# Patient Record
Sex: MALE | Race: WHITE | NOT HISPANIC OR LATINO | Employment: OTHER | URBAN - METROPOLITAN AREA
[De-identification: names, ages, dates, MRNs, and addresses within clinical notes are randomized per-mention and may not be internally consistent; named-entity substitution may affect disease eponyms.]

---

## 2023-02-08 PROBLEM — I48.91 ATRIAL FIBRILLATION (MULTI): Status: ACTIVE | Noted: 2023-02-08

## 2023-02-08 PROBLEM — R20.2 HAND PARESTHESIA: Status: ACTIVE | Noted: 2023-02-08

## 2023-02-08 PROBLEM — N40.0 BPH (BENIGN PROSTATIC HYPERPLASIA): Status: ACTIVE | Noted: 2023-02-08

## 2023-02-08 PROBLEM — M25.569 JOINT PAIN, KNEE: Status: ACTIVE | Noted: 2023-02-08

## 2023-02-08 PROBLEM — S60.222A CONTUSION OF HAND, LEFT: Status: ACTIVE | Noted: 2023-02-08

## 2023-02-08 PROBLEM — R63.6 UNDERWEIGHT: Status: ACTIVE | Noted: 2023-02-08

## 2023-02-08 PROBLEM — R79.89 ELEVATED SERUM CREATININE: Status: ACTIVE | Noted: 2023-02-08

## 2023-02-08 PROBLEM — M54.50 LOWER BACK PAIN: Status: ACTIVE | Noted: 2023-02-08

## 2023-02-08 PROBLEM — L98.9 SKIN LESION: Status: ACTIVE | Noted: 2023-02-08

## 2023-02-08 PROBLEM — K31.7 GASTRIC POLYP: Status: ACTIVE | Noted: 2023-02-08

## 2023-02-08 PROBLEM — K62.5 RECTAL HEMORRHAGE: Status: ACTIVE | Noted: 2023-02-08

## 2023-02-08 PROBLEM — J06.9 ACUTE URI: Status: ACTIVE | Noted: 2023-02-08

## 2023-02-08 PROBLEM — I25.10 CORONARY ATHEROSCLEROSIS: Status: ACTIVE | Noted: 2023-02-08

## 2023-02-08 PROBLEM — N28.1 RENAL CYST: Status: ACTIVE | Noted: 2023-02-08

## 2023-02-08 PROBLEM — D64.9 ANEMIA: Status: ACTIVE | Noted: 2023-02-08

## 2023-02-08 PROBLEM — K63.89 SMALL BOWEL POLYP: Status: ACTIVE | Noted: 2023-02-08

## 2023-02-08 PROBLEM — H91.90 HEARING LOSS: Status: ACTIVE | Noted: 2023-02-08

## 2023-02-08 PROBLEM — F03.90 DEMENTIA WITHOUT BEHAVIORAL DISTURBANCE (MULTI): Status: ACTIVE | Noted: 2023-02-08

## 2023-02-08 PROBLEM — E87.1 HYPONATREMIA: Status: ACTIVE | Noted: 2023-02-08

## 2023-02-08 PROBLEM — M25.562 LEFT KNEE PAIN: Status: ACTIVE | Noted: 2023-02-08

## 2023-02-08 PROBLEM — R05.9 COUGH: Status: ACTIVE | Noted: 2023-02-08

## 2023-02-08 PROBLEM — N20.0 CALCULUS OF KIDNEY: Status: ACTIVE | Noted: 2023-02-08

## 2023-02-08 PROBLEM — K80.20 GALLSTONES: Status: ACTIVE | Noted: 2023-02-08

## 2023-02-08 PROBLEM — I10 BENIGN ESSENTIAL HYPERTENSION: Status: ACTIVE | Noted: 2023-02-08

## 2023-02-08 PROBLEM — R63.4 WEIGHT LOSS: Status: ACTIVE | Noted: 2023-02-08

## 2023-02-08 PROBLEM — H35.30 MACULAR DEGENERATION: Status: ACTIVE | Noted: 2023-02-08

## 2023-02-08 PROBLEM — K22.70 BARRETT ESOPHAGUS: Status: ACTIVE | Noted: 2023-02-08

## 2023-02-08 PROBLEM — R21 RASH: Status: ACTIVE | Noted: 2023-02-08
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
## 2023-02-08 PROBLEM — G62.9 PERIPHERAL NEUROPATHY: Status: ACTIVE | Noted: 2023-02-08

## 2023-02-08 PROBLEM — K80.20 ASYMPTOMATIC CHOLELITHIASIS: Status: ACTIVE | Noted: 2023-02-08

## 2023-02-08 PROBLEM — S01.511A LIP LACERATION: Status: ACTIVE | Noted: 2023-02-08

## 2023-02-08 PROBLEM — H61.20 EXCESSIVE CERUMEN IN EAR CANAL: Status: ACTIVE | Noted: 2023-02-08

## 2023-02-08 PROBLEM — R41.89 COGNITIVE IMPAIRMENT: Status: ACTIVE | Noted: 2023-02-08

## 2023-02-08 PROBLEM — I51.9 HEART DISEASE: Status: ACTIVE | Noted: 2023-02-08

## 2023-02-08 PROBLEM — N39.0 UTI (URINARY TRACT INFECTION): Status: ACTIVE | Noted: 2023-02-08

## 2023-02-08 PROBLEM — J06.9 ACUTE RESPIRATORY DISEASE: Status: ACTIVE | Noted: 2023-02-08

## 2023-02-08 PROBLEM — K59.00 CONSTIPATED: Status: ACTIVE | Noted: 2023-02-08

## 2023-02-08 PROBLEM — M25.519 SHOULDER PAIN: Status: ACTIVE | Noted: 2023-02-08

## 2023-02-08 PROBLEM — J20.9 ACUTE BRONCHITIS WITH BRONCHOSPASM: Status: ACTIVE | Noted: 2023-02-08

## 2023-02-08 PROBLEM — H61.23 BILATERAL IMPACTED CERUMEN: Status: ACTIVE | Noted: 2023-02-08

## 2023-02-08 PROBLEM — R58 BLOOD IN UNDERWEAR: Status: ACTIVE | Noted: 2023-02-08

## 2023-02-08 PROBLEM — R68.89 FORGETFULNESS: Status: ACTIVE | Noted: 2023-02-08

## 2023-02-08 PROBLEM — K63.5 BENIGN COLON POLYP: Status: ACTIVE | Noted: 2023-02-08

## 2023-02-08 PROBLEM — K40.90 INGUINAL HERNIA: Status: ACTIVE | Noted: 2023-02-08

## 2023-02-08 PROBLEM — E78.5 HYPERLIPIDEMIA: Status: ACTIVE | Noted: 2023-02-08

## 2023-02-08 PROBLEM — K21.9 GERD (GASTROESOPHAGEAL REFLUX DISEASE): Status: ACTIVE | Noted: 2023-02-08

## 2023-02-08 PROBLEM — D35.00 ADRENAL ADENOMA: Status: ACTIVE | Noted: 2023-02-08

## 2023-02-08 PROBLEM — R53.1 GENERALIZED WEAKNESS: Status: ACTIVE | Noted: 2023-02-08

## 2023-02-08 RX ORDER — TAMSULOSIN HYDROCHLORIDE 0.4 MG/1
0.4 CAPSULE ORAL
COMMUNITY
Start: 2015-09-28 | End: 2023-03-28 | Stop reason: SDUPTHER

## 2023-02-08 RX ORDER — LISINOPRIL 40 MG/1
TABLET ORAL
COMMUNITY
Start: 2021-05-28 | End: 2024-03-20 | Stop reason: SDUPTHER

## 2023-02-08 RX ORDER — FAMOTIDINE 20 MG/1
1 TABLET, FILM COATED ORAL
COMMUNITY
Start: 2020-04-21 | End: 2023-12-18 | Stop reason: SDUPTHER

## 2023-02-08 RX ORDER — UBIDECARENONE 75 MG
CAPSULE ORAL
Status: ON HOLD | COMMUNITY
End: 2024-06-01 | Stop reason: ENTERED-IN-ERROR

## 2023-02-24 ENCOUNTER — DOCUMENTATION (OUTPATIENT)
Dept: PRIMARY CARE | Facility: CLINIC | Age: 88
End: 2023-02-24
Payer: MEDICARE

## 2023-03-21 ENCOUNTER — OFFICE VISIT (OUTPATIENT)
Dept: PRIMARY CARE | Facility: CLINIC | Age: 88
End: 2023-03-21
Payer: MEDICARE

## 2023-03-21 VITALS
DIASTOLIC BLOOD PRESSURE: 62 MMHG | WEIGHT: 115.4 LBS | BODY MASS INDEX: 20.44 KG/M2 | TEMPERATURE: 97.6 F | SYSTOLIC BLOOD PRESSURE: 121 MMHG | OXYGEN SATURATION: 97 % | HEART RATE: 59 BPM

## 2023-03-21 DIAGNOSIS — E78.5 HYPERLIPIDEMIA, UNSPECIFIED HYPERLIPIDEMIA TYPE: ICD-10-CM

## 2023-03-21 DIAGNOSIS — I10 BENIGN ESSENTIAL HYPERTENSION: ICD-10-CM

## 2023-03-21 DIAGNOSIS — Z00.00 MEDICARE ANNUAL WELLNESS VISIT, SUBSEQUENT: ICD-10-CM

## 2023-03-21 DIAGNOSIS — Z00.00 ANNUAL PHYSICAL EXAM: ICD-10-CM

## 2023-03-21 DIAGNOSIS — I25.10 ATHEROSCLEROSIS OF NATIVE CORONARY ARTERY OF NATIVE HEART WITHOUT ANGINA PECTORIS: ICD-10-CM

## 2023-03-21 DIAGNOSIS — I48.0 PAROXYSMAL ATRIAL FIBRILLATION (MULTI): ICD-10-CM

## 2023-03-21 DIAGNOSIS — F03.A0 MILD DEMENTIA, UNSPECIFIED DEMENTIA TYPE, UNSPECIFIED WHETHER BEHAVIORAL, PSYCHOTIC, OR MOOD DISTURBANCE OR ANXIETY (MULTI): Primary | ICD-10-CM

## 2023-03-21 DIAGNOSIS — R63.4 WEIGHT LOSS: ICD-10-CM

## 2023-03-21 PROBLEM — J06.9 ACUTE URI: Status: RESOLVED | Noted: 2023-02-08 | Resolved: 2023-03-21

## 2023-03-21 PROBLEM — R58 BLOOD IN UNDERWEAR: Status: RESOLVED | Noted: 2023-02-08 | Resolved: 2023-03-21

## 2023-03-21 PROBLEM — R21 RASH: Status: RESOLVED | Noted: 2023-02-08 | Resolved: 2023-03-21

## 2023-03-21 PROBLEM — D64.9 ANEMIA: Status: RESOLVED | Noted: 2023-02-08 | Resolved: 2023-03-21

## 2023-03-21 PROBLEM — K62.5 RECTAL HEMORRHAGE: Status: RESOLVED | Noted: 2023-02-08 | Resolved: 2023-03-21

## 2023-03-21 PROBLEM — E87.1 HYPONATREMIA: Status: RESOLVED | Noted: 2023-02-08 | Resolved: 2023-03-21

## 2023-03-21 PROBLEM — M54.50 LOWER BACK PAIN: Status: RESOLVED | Noted: 2023-02-08 | Resolved: 2023-03-21

## 2023-03-21 PROBLEM — K59.00 CONSTIPATED: Status: RESOLVED | Noted: 2023-02-08 | Resolved: 2023-03-21

## 2023-03-21 PROCEDURE — 1160F RVW MEDS BY RX/DR IN RCRD: CPT | Performed by: INTERNAL MEDICINE

## 2023-03-21 PROCEDURE — G0442 ANNUAL ALCOHOL SCREEN 15 MIN: HCPCS | Performed by: INTERNAL MEDICINE

## 2023-03-21 PROCEDURE — 93000 ELECTROCARDIOGRAM COMPLETE: CPT | Performed by: INTERNAL MEDICINE

## 2023-03-21 PROCEDURE — 3074F SYST BP LT 130 MM HG: CPT | Performed by: INTERNAL MEDICINE

## 2023-03-21 PROCEDURE — G0439 PPPS, SUBSEQ VISIT: HCPCS | Performed by: INTERNAL MEDICINE

## 2023-03-21 PROCEDURE — 1159F MED LIST DOCD IN RCRD: CPT | Performed by: INTERNAL MEDICINE

## 2023-03-21 PROCEDURE — 99397 PER PM REEVAL EST PAT 65+ YR: CPT | Performed by: INTERNAL MEDICINE

## 2023-03-21 PROCEDURE — 99214 OFFICE O/P EST MOD 30 MIN: CPT | Performed by: INTERNAL MEDICINE

## 2023-03-21 PROCEDURE — G0444 DEPRESSION SCREEN ANNUAL: HCPCS | Performed by: INTERNAL MEDICINE

## 2023-03-21 PROCEDURE — 1170F FXNL STATUS ASSESSED: CPT | Performed by: INTERNAL MEDICINE

## 2023-03-21 PROCEDURE — 1036F TOBACCO NON-USER: CPT | Performed by: INTERNAL MEDICINE

## 2023-03-21 PROCEDURE — 3078F DIAST BP <80 MM HG: CPT | Performed by: INTERNAL MEDICINE

## 2023-03-21 ASSESSMENT — PATIENT HEALTH QUESTIONNAIRE - PHQ9
SUM OF ALL RESPONSES TO PHQ9 QUESTIONS 1 AND 2: 0
1. LITTLE INTEREST OR PLEASURE IN DOING THINGS: NOT AT ALL
2. FEELING DOWN, DEPRESSED OR HOPELESS: NOT AT ALL

## 2023-03-21 ASSESSMENT — ACTIVITIES OF DAILY LIVING (ADL)
BATHING: INDEPENDENT
GROCERY_SHOPPING: INDEPENDENT
DOING_HOUSEWORK: INDEPENDENT
DRESSING: INDEPENDENT
TAKING_MEDICATION: INDEPENDENT
MANAGING_FINANCES: INDEPENDENT

## 2023-03-21 ASSESSMENT — ENCOUNTER SYMPTOMS
ENDOCRINE NEGATIVE: 1
DIARRHEA: 0
GASTROINTESTINAL NEGATIVE: 1
ABDOMINAL PAIN: 0
NAUSEA: 0
HEADACHES: 0
DYSURIA: 0
EYES NEGATIVE: 1
COUGH: 0
FATIGUE: 0
DIFFICULTY URINATING: 0
DIZZINESS: 0
BLOOD IN STOOL: 0
HEMATOLOGIC/LYMPHATIC NEGATIVE: 1
RESPIRATORY NEGATIVE: 1
NEUROLOGICAL NEGATIVE: 1
CHEST TIGHTNESS: 0
SHORTNESS OF BREATH: 0
UNEXPECTED WEIGHT CHANGE: 1

## 2023-03-21 NOTE — PROGRESS NOTES
"Subjective   Patient ID: Sherri Grace is a 89 y.o. male who presents for Medicare Annual Wellness Visit Subsequent, Annual Exam, and Follow-up.    He is here accompanied by his wife and son in law  for MCR,CPE and to follow up on his medical problems ,in past  he saw neuro for his dementia,has been stable,has some 'behavioral issues,angry sometimes,he also has asymptomatic gallstones.has h/o A fib and CAD,s/p stent,,he exercise regularly and denies any chest pain except lately and with the covid pandemiac he has not been going to the gym.  He sees Dr Reagan for his A Fib,on Eliquis.  he denies any CP or SOB.  he sees Dr Shelby for eye exam,up to date.  he has a living will.  He denies any depressed moods,no alcohol abuse.  We had home care involved at last visit,they have meals on wheels,bt pt lost 5 more pounds,his wife and son in law not interested  in doing aggressive work up \"such rule out malignancy\",\"not interested in treatment even if he has it due to age and dementia.           Review of Systems   Constitutional:  Positive for unexpected weight change. Negative for fatigue.   HENT: Negative.  Negative for congestion.    Eyes: Negative.    Respiratory: Negative.  Negative for cough, chest tightness and shortness of breath.    Cardiovascular:  Negative for chest pain and leg swelling.   Gastrointestinal: Negative.  Negative for abdominal pain, blood in stool, diarrhea and nausea.   Endocrine: Negative.    Genitourinary: Negative.  Negative for difficulty urinating and dysuria.   Neurological: Negative.  Negative for dizziness and headaches.   Hematological: Negative.    Psychiatric/Behavioral:          Has dementia.       Objective   /62 (BP Location: Right arm, Patient Position: Sitting, BP Cuff Size: Adult)   Pulse 59   Temp 36.4 °C (97.6 °F) (Temporal)   Wt 52.3 kg (115 lb 6.4 oz)   SpO2 97%   BMI 20.44 kg/m²     Physical Exam  Vitals reviewed.   Constitutional:       Appearance: Normal " appearance.   HENT:      Head: Normocephalic and atraumatic.   Eyes:      Extraocular Movements: Extraocular movements intact.      Pupils: Pupils are equal, round, and reactive to light.   Cardiovascular:      Rate and Rhythm: Rhythm irregular.      Heart sounds: Normal heart sounds.   Pulmonary:      Effort: Pulmonary effort is normal. No respiratory distress.      Breath sounds: Normal breath sounds. No wheezing or rhonchi.   Abdominal:      General: Abdomen is flat. Bowel sounds are normal.      Palpations: Abdomen is soft.      Tenderness: There is no abdominal tenderness.   Musculoskeletal:         General: Normal range of motion.      Cervical back: Normal range of motion and neck supple.   Skin:     General: Skin is warm and dry.   Neurological:      General: No focal deficit present.      Mental Status: He is alert and oriented to person, place, and time.   Psychiatric:         Mood and Affect: Mood normal.         Behavior: Behavior normal.         Assessment/Plan

## 2023-03-21 NOTE — PROGRESS NOTES
Patient is here for an annual [facility] wellness visit, CPE, and follow up.

## 2023-03-22 NOTE — PATIENT INSTRUCTIONS
EKG done today.  Refer to psych for behavioral issues related to dementia.  I discussed case with wife and son in law.  Continue same meds.  I encouraged pt to go back to the GYM,he used to enjoy exercising there and socialize with othe rpeople.  Return in 6 months for follow up.  Keep appt with cardiologist.  Add Ensure and monitor weight.

## 2023-03-28 DIAGNOSIS — N40.0 BENIGN PROSTATIC HYPERPLASIA, UNSPECIFIED WHETHER LOWER URINARY TRACT SYMPTOMS PRESENT: ICD-10-CM

## 2023-03-28 RX ORDER — TAMSULOSIN HYDROCHLORIDE 0.4 MG/1
CAPSULE ORAL
Qty: 90 CAPSULE | Refills: 3 | Status: SHIPPED | OUTPATIENT
Start: 2023-03-28 | End: 2024-04-15 | Stop reason: SDUPTHER

## 2023-04-12 ENCOUNTER — PATIENT OUTREACH (OUTPATIENT)
Dept: PRIMARY CARE | Facility: CLINIC | Age: 88
End: 2023-04-12
Payer: MEDICARE

## 2023-05-22 ENCOUNTER — PATIENT OUTREACH (OUTPATIENT)
Dept: PRIMARY CARE | Facility: CLINIC | Age: 88
End: 2023-05-22
Payer: MEDICARE

## 2023-05-22 DIAGNOSIS — F03.A0 MILD DEMENTIA, UNSPECIFIED DEMENTIA TYPE, UNSPECIFIED WHETHER BEHAVIORAL, PSYCHOTIC, OR MOOD DISTURBANCE OR ANXIETY (MULTI): ICD-10-CM

## 2023-05-22 DIAGNOSIS — I10 BENIGN ESSENTIAL HYPERTENSION: ICD-10-CM

## 2023-05-22 PROCEDURE — 99490 CHRNC CARE MGMT STAFF 1ST 20: CPT | Performed by: INTERNAL MEDICINE

## 2023-05-22 NOTE — PROGRESS NOTES
Spoke with the patient's daughter who stated that the patient was having trouble with his fern and memory.

## 2023-07-18 ENCOUNTER — PATIENT OUTREACH (OUTPATIENT)
Dept: PRIMARY CARE | Facility: CLINIC | Age: 88
End: 2023-07-18
Payer: MEDICARE

## 2023-07-18 DIAGNOSIS — F03.A0 MILD DEMENTIA, UNSPECIFIED DEMENTIA TYPE, UNSPECIFIED WHETHER BEHAVIORAL, PSYCHOTIC, OR MOOD DISTURBANCE OR ANXIETY (MULTI): ICD-10-CM

## 2023-07-18 DIAGNOSIS — I10 BENIGN ESSENTIAL HYPERTENSION: ICD-10-CM

## 2023-07-18 PROCEDURE — 99490 CHRNC CARE MGMT STAFF 1ST 20: CPT | Performed by: INTERNAL MEDICINE

## 2023-08-01 ENCOUNTER — DOCUMENTATION (OUTPATIENT)
Dept: PRIMARY CARE | Facility: CLINIC | Age: 88
End: 2023-08-01
Payer: MEDICARE

## 2023-08-14 ENCOUNTER — PATIENT OUTREACH (OUTPATIENT)
Dept: PRIMARY CARE | Facility: CLINIC | Age: 88
End: 2023-08-14
Payer: MEDICARE

## 2023-08-14 DIAGNOSIS — F03.A11 MILD DEMENTIA WITH AGITATION, UNSPECIFIED DEMENTIA TYPE (MULTI): ICD-10-CM

## 2023-08-14 DIAGNOSIS — I10 HYPERTENSION, UNSPECIFIED TYPE: ICD-10-CM

## 2023-08-21 ENCOUNTER — TELEPHONE (OUTPATIENT)
Dept: PRIMARY CARE | Facility: CLINIC | Age: 88
End: 2023-08-21
Payer: MEDICARE

## 2023-08-21 NOTE — TELEPHONE ENCOUNTER
Pt needs a referral for a podiatrist sent to [facility] fax [phone], phone no. Is [phone]  Call daughter with questionsa

## 2023-09-05 ENCOUNTER — TELEPHONE (OUTPATIENT)
Dept: PHARMACY | Facility: HOSPITAL | Age: 88
End: 2023-09-05
Payer: MEDICARE

## 2023-09-05 NOTE — TELEPHONE ENCOUNTER
Population Health: Outreach by Ambulatory Pharmacy Team    Payor: [employer] [place]  Reason: Adherence  Medication: lisinopril 40mg  Outcome: Left Voicemail    Chau Nails, PharmD    PGY-1 Resident

## 2023-09-06 NOTE — TELEPHONE ENCOUNTER
I reviewed the progress note and agree with the resident’s findings and plans as written. Case discussed with resident.    Brent Rubalcava, PharmD

## 2023-09-14 ENCOUNTER — PATIENT OUTREACH (OUTPATIENT)
Dept: PRIMARY CARE | Facility: CLINIC | Age: 88
End: 2023-09-14
Payer: MEDICARE

## 2023-09-14 DIAGNOSIS — F03.A11 MILD DEMENTIA WITH AGITATION, UNSPECIFIED DEMENTIA TYPE (MULTI): ICD-10-CM

## 2023-09-14 DIAGNOSIS — I10 HYPERTENSION, UNSPECIFIED TYPE: ICD-10-CM

## 2023-09-20 ENCOUNTER — TELEMEDICINE (OUTPATIENT)
Dept: PRIMARY CARE | Facility: CLINIC | Age: 88
End: 2023-09-20
Payer: MEDICARE

## 2023-09-20 DIAGNOSIS — R41.89 COGNITIVE IMPAIRMENT: ICD-10-CM

## 2023-09-20 DIAGNOSIS — I10 BENIGN ESSENTIAL HYPERTENSION: ICD-10-CM

## 2023-09-20 DIAGNOSIS — E78.5 HYPERLIPIDEMIA, UNSPECIFIED HYPERLIPIDEMIA TYPE: ICD-10-CM

## 2023-09-20 DIAGNOSIS — I48.0 PAROXYSMAL ATRIAL FIBRILLATION (MULTI): ICD-10-CM

## 2023-09-20 DIAGNOSIS — F33.9 DEPRESSION, RECURRENT (CMS-HCC): Primary | ICD-10-CM

## 2023-09-20 PROBLEM — H61.22 IMPACTED CERUMEN OF LEFT EAR: Status: ACTIVE | Noted: 2023-09-20

## 2023-09-20 PROBLEM — H60.90 OTITIS EXTERNA: Status: ACTIVE | Noted: 2023-09-20

## 2023-09-20 PROCEDURE — 99213 OFFICE O/P EST LOW 20 MIN: CPT | Performed by: INTERNAL MEDICINE

## 2023-09-20 ASSESSMENT — ENCOUNTER SYMPTOMS
HEADACHES: 0
DIFFICULTY URINATING: 0
DYSURIA: 0
SHORTNESS OF BREATH: 0
BLOOD IN STOOL: 0
RESPIRATORY NEGATIVE: 1
CHEST TIGHTNESS: 0
ABDOMINAL PAIN: 0
DIZZINESS: 0
FATIGUE: 0
DIARRHEA: 0
EYES NEGATIVE: 1
UNEXPECTED WEIGHT CHANGE: 0
NEUROLOGICAL NEGATIVE: 1
CONFUSION: 1
GASTROINTESTINAL NEGATIVE: 1
ENDOCRINE NEGATIVE: 1
COUGH: 0
HEMATOLOGIC/LYMPHATIC NEGATIVE: 1
NAUSEA: 0

## 2023-09-20 ASSESSMENT — PATIENT HEALTH QUESTIONNAIRE - PHQ9
10. IF YOU CHECKED OFF ANY PROBLEMS, HOW DIFFICULT HAVE THESE PROBLEMS MADE IT FOR YOU TO DO YOUR WORK, TAKE CARE OF THINGS AT HOME, OR GET ALONG WITH OTHER PEOPLE: VERY DIFFICULT
8. MOVING OR SPEAKING SO SLOWLY THAT OTHER PEOPLE COULD HAVE NOTICED. OR THE OPPOSITE, BEING SO FIGETY OR RESTLESS THAT YOU HAVE BEEN MOVING AROUND A LOT MORE THAN USUAL: NOT AT ALL
1. LITTLE INTEREST OR PLEASURE IN DOING THINGS: NOT AT ALL
6. FEELING BAD ABOUT YOURSELF - OR THAT YOU ARE A FAILURE OR HAVE LET YOURSELF OR YOUR FAMILY DOWN: NEARLY EVERY DAY
9. THOUGHTS THAT YOU WOULD BE BETTER OFF DEAD, OR OF HURTING YOURSELF: SEVERAL DAYS
SUM OF ALL RESPONSES TO PHQ QUESTIONS 1-9: 8
2. FEELING DOWN, DEPRESSED OR HOPELESS: NEARLY EVERY DAY
7. TROUBLE CONCENTRATING ON THINGS, SUCH AS READING THE NEWSPAPER OR WATCHING TELEVISION: NOT AT ALL
4. FEELING TIRED OR HAVING LITTLE ENERGY: SEVERAL DAYS
SUM OF ALL RESPONSES TO PHQ9 QUESTIONS 1 AND 2: 3
5. POOR APPETITE OR OVEREATING: NOT AT ALL
3. TROUBLE FALLING OR STAYING ASLEEP OR SLEEPING TOO MUCH: NOT AT ALL

## 2023-09-20 NOTE — PROGRESS NOTES
"Subjective   Patient ID: Sherri Grace is a 89 y.o. male who presents for 6 month follow up .    Pt seen virtually with his daughter Everett by his side to follow up on his medical problems ,in past  he saw neuro Dr Garcia for his dementia,has been stable,has some 'behavioral issues,angry sometimes,he denies any complaints but his wife and daughter told me that he gets angry very easy.  he also has asymptomatic gallstones.has h/o A fib and CAD,s/p stent,  He sees Dr Reagan for his A Fib,on Eliquis.  he denies any CP or SOB.  he sees Dr Shelby for eye exam,up to date.  he has a living will.  He denies any depressed moods,no alcohol abuse.  We had home care involved at last visit,they have meals on wheels,bt pt lost some weight,his wife and son in law not interested  in doing aggressive work up \"such rule out malignancy\",\"not interested in treatment even if he has it due to age and dementia.           Review of Systems   Constitutional:  Negative for fatigue and unexpected weight change.   HENT: Negative.  Negative for congestion.    Eyes: Negative.    Respiratory: Negative.  Negative for cough, chest tightness and shortness of breath.    Cardiovascular:  Negative for chest pain and leg swelling.   Gastrointestinal: Negative.  Negative for abdominal pain, blood in stool, diarrhea and nausea.   Endocrine: Negative.    Genitourinary: Negative.  Negative for difficulty urinating and dysuria.   Neurological: Negative.  Negative for dizziness and headaches.   Hematological: Negative.    Psychiatric/Behavioral:  Positive for confusion.        Objective   There were no vitals taken for this visit.    Physical Exam  Constitutional:       General: He is not in acute distress.     Appearance: Normal appearance.   Pulmonary:      Effort: No respiratory distress.   Psychiatric:      Comments: Cheerful.         Assessment/Plan   Problem List Items Addressed This Visit       Atrial fibrillation (CMS/HCC)    Benign essential " hypertension     Stable on current med.  I recommend flu and covid booster vaccine.  Follow up in 3 months.         Cognitive impairment     Advised family to contact neuro to discuss his behavior and possible need for med.         Hyperlipidemia    Depression, recurrent (CMS/HCC) - Primary

## 2023-09-22 ENCOUNTER — APPOINTMENT (OUTPATIENT)
Dept: PRIMARY CARE | Facility: CLINIC | Age: 88
End: 2023-09-22
Payer: MEDICARE

## 2023-09-28 ENCOUNTER — TELEPHONE (OUTPATIENT)
Dept: PHARMACY | Facility: HOSPITAL | Age: 88
End: 2023-09-28
Payer: MEDICARE

## 2023-09-28 NOTE — TELEPHONE ENCOUNTER
Population Health: Outreach by Ambulatory Pharmacy Team    Payor: [employer] [place]  Reason: Adherence  Medication: Lisinopril 40 mg   Outcome: Patient Reached: Claims Adherence, Patient's POA says it should be delivered from [facility]    Venessa Spivey, Pharm D  PGY-1 Pharmacy Resident, [facility]

## 2023-10-12 ENCOUNTER — PATIENT OUTREACH (OUTPATIENT)
Dept: PRIMARY CARE | Facility: CLINIC | Age: 88
End: 2023-10-12
Payer: MEDICARE

## 2023-10-12 DIAGNOSIS — I10 HYPERTENSION, UNSPECIFIED TYPE: ICD-10-CM

## 2023-10-12 DIAGNOSIS — F32.A DEPRESSION, UNSPECIFIED DEPRESSION TYPE: ICD-10-CM

## 2023-10-12 PROCEDURE — 99490 CHRNC CARE MGMT STAFF 1ST 20: CPT | Performed by: INTERNAL MEDICINE

## 2023-10-12 NOTE — PROGRESS NOTES
Spoke with dtr. States that pt is physically doing fine but mentally and cognitively declining. Continues to have depression and is argumentative about any and everything and is also forgetful.   Pt remains active and ambulates with no device. Remains free of falls. Current med list up to date per dtr.     Dtr was wondering the name of memory Dr that was recommended to her. Will reach out to PCP

## 2023-10-18 ENCOUNTER — CLINICAL SUPPORT (OUTPATIENT)
Dept: PRIMARY CARE | Facility: CLINIC | Age: 88
End: 2023-10-18
Payer: MEDICARE

## 2023-10-18 PROCEDURE — G0008 ADMIN INFLUENZA VIRUS VAC: HCPCS | Performed by: INTERNAL MEDICINE

## 2023-10-18 PROCEDURE — 90662 IIV NO PRSV INCREASED AG IM: CPT | Performed by: INTERNAL MEDICINE

## 2023-10-18 NOTE — PROGRESS NOTES
Patient presents in the office for Flu vaccine.     This drug was administered by John Alanis MA at 11:20 AM per physician order.    Patient tolerated well.

## 2023-10-30 ENCOUNTER — TELEPHONE (OUTPATIENT)
Dept: PRIMARY CARE | Facility: CLINIC | Age: 88
End: 2023-10-30
Payer: MEDICARE

## 2023-10-30 NOTE — TELEPHONE ENCOUNTER
Patient's daughter, Everett would like a referral/order for an in home podiatrist for patient. She said that you gave her mother, Karina a referral and she would like the same for her father. Please advise.

## 2023-10-31 PROBLEM — S60.922A: Status: ACTIVE | Noted: 2018-07-03

## 2023-11-06 ENCOUNTER — PATIENT OUTREACH (OUTPATIENT)
Dept: PRIMARY CARE | Facility: CLINIC | Age: 88
End: 2023-11-06
Payer: MEDICARE

## 2023-11-06 DIAGNOSIS — F32.A DEPRESSION, UNSPECIFIED DEPRESSION TYPE: ICD-10-CM

## 2023-11-06 DIAGNOSIS — I10 HYPERTENSION, UNSPECIFIED TYPE: ICD-10-CM

## 2023-11-06 PROCEDURE — 99490 CHRNC CARE MGMT STAFF 1ST 20: CPT | Performed by: INTERNAL MEDICINE

## 2023-11-06 NOTE — PROGRESS NOTES
Spoke with dtr regarding pt. He is doing the same as far as behavior - mentally/cognitively impaired. No changes with medications noted. Ambulating well with no device. Dtr is aware to call this nurse with any questions or concerns.

## 2023-12-06 ENCOUNTER — OFFICE VISIT (OUTPATIENT)
Dept: PRIMARY CARE | Facility: CLINIC | Age: 88
End: 2023-12-06
Payer: MEDICARE

## 2023-12-06 VITALS
DIASTOLIC BLOOD PRESSURE: 68 MMHG | HEART RATE: 65 BPM | SYSTOLIC BLOOD PRESSURE: 122 MMHG | BODY MASS INDEX: 21.65 KG/M2 | OXYGEN SATURATION: 98 % | WEIGHT: 126.8 LBS | HEIGHT: 64 IN

## 2023-12-06 DIAGNOSIS — N40.0 BENIGN PROSTATIC HYPERPLASIA WITHOUT LOWER URINARY TRACT SYMPTOMS: ICD-10-CM

## 2023-12-06 DIAGNOSIS — F03.A3 MILD DEMENTIA WITH MOOD DISTURBANCE, UNSPECIFIED DEMENTIA TYPE (MULTI): Primary | ICD-10-CM

## 2023-12-06 DIAGNOSIS — I48.0 PAROXYSMAL ATRIAL FIBRILLATION (MULTI): ICD-10-CM

## 2023-12-06 DIAGNOSIS — I51.9 HEART DISEASE: ICD-10-CM

## 2023-12-06 DIAGNOSIS — K80.20 GALLSTONES: ICD-10-CM

## 2023-12-06 PROCEDURE — 1159F MED LIST DOCD IN RCRD: CPT | Performed by: INTERNAL MEDICINE

## 2023-12-06 PROCEDURE — G0009 ADMIN PNEUMOCOCCAL VACCINE: HCPCS | Performed by: INTERNAL MEDICINE

## 2023-12-06 PROCEDURE — 3074F SYST BP LT 130 MM HG: CPT | Performed by: INTERNAL MEDICINE

## 2023-12-06 PROCEDURE — 1036F TOBACCO NON-USER: CPT | Performed by: INTERNAL MEDICINE

## 2023-12-06 PROCEDURE — 1160F RVW MEDS BY RX/DR IN RCRD: CPT | Performed by: INTERNAL MEDICINE

## 2023-12-06 PROCEDURE — 90677 PCV20 VACCINE IM: CPT | Performed by: INTERNAL MEDICINE

## 2023-12-06 PROCEDURE — 99214 OFFICE O/P EST MOD 30 MIN: CPT | Performed by: INTERNAL MEDICINE

## 2023-12-06 PROCEDURE — 3078F DIAST BP <80 MM HG: CPT | Performed by: INTERNAL MEDICINE

## 2023-12-06 ASSESSMENT — PATIENT HEALTH QUESTIONNAIRE - PHQ9
SUM OF ALL RESPONSES TO PHQ9 QUESTIONS 1 AND 2: 0
2. FEELING DOWN, DEPRESSED OR HOPELESS: NOT AT ALL
1. LITTLE INTEREST OR PLEASURE IN DOING THINGS: NOT AT ALL

## 2023-12-06 NOTE — PROGRESS NOTES
"Subjective   Patient ID: Sherri Grace is a 89 y.o. male who presents for Follow-up.    This is a 89-year-old patient was brought in today by his son-in-law to follow up on his medical problems ,he has not seen the neuro Dr Garcia yet,whom he saw in past for his dementia,has some 'behavioral issues,angry sometimes, patient himself denies any complaints but his family told me that he gets angry very easy.  he also has asymptomatic gallstones.has h/o A fib and CAD,s/p stent,  He sees Dr Reagan for his A Fib,on [facility].  he denies any CP or SOB.  he sees Dr Shelby for eye exam,up to date.  he has a living will.  He denies any depressed moods,no alcohol abuse.  We had home care involved at last visit,they have meals on wheels,bt pt lost some weight,his wife and son in law not interested  in doing aggressive work up \"such rule out malignancy\",\"not interested in treatment even if he has it due to age and dementia.  But patient has gained few pounds since his last visit with me.           Review of Systems   Constitutional:  Negative for fatigue and unexpected weight change.   HENT: Negative.  Negative for congestion.    Eyes: Negative.    Respiratory: Negative.  Negative for cough, chest tightness and shortness of breath.    Cardiovascular:  Negative for chest pain and leg swelling.   Gastrointestinal: Negative.  Negative for abdominal pain, blood in stool, diarrhea and nausea.   Endocrine: Negative.    Genitourinary: Negative.  Negative for difficulty urinating and dysuria.   Neurological:  Negative for dizziness and headaches.        As HPI     Hematological: Negative.    Psychiatric/Behavioral:          As HPI.       Objective   /68 (BP Location: Left arm, Patient Position: Sitting)   Pulse 65   Ht 1.613 m (5' 3.5\")   Wt 57.5 kg (126 lb 12.8 oz)   SpO2 98%   BMI 22.11 kg/m²     Physical Exam  Vitals reviewed.   Constitutional:       Appearance: Normal appearance.   HENT:      Head: Normocephalic and " atraumatic.   Eyes:      Extraocular Movements: Extraocular movements intact.      Pupils: Pupils are equal, round, and reactive to light.   Cardiovascular:      Rate and Rhythm: Rhythm irregular.      Heart sounds: Normal heart sounds.   Pulmonary:      Effort: Pulmonary effort is normal. No respiratory distress.      Breath sounds: Normal breath sounds. No wheezing or rhonchi.   Abdominal:      General: Abdomen is flat. Bowel sounds are normal.      Palpations: Abdomen is soft.      Tenderness: There is no abdominal tenderness.   Musculoskeletal:         General: Normal range of motion.      Cervical back: Normal range of motion and neck supple.   Skin:     General: Skin is warm and dry.   Neurological:      General: No focal deficit present.      Mental Status: He is alert and oriented to person, place, and time.   Psychiatric:      Comments: Pleasantly demented cooperative with exam         Assessment/Plan   Problem List Items Addressed This Visit             ICD-10-CM    Atrial fibrillation (CMS/HCC) I48.91     Stable on current medication.  Prevnar 20 given to patient today.         BPH (benign prostatic hyperplasia) N40.0    Mild dementia (CMS/HCC) - Primary F03.A0     I referred patient back to his neurologist.         Heart disease I51.9     Patient denies any chest pain.  Stable continue same medication.         Gallstones K80.20     Asymptomatic.

## 2023-12-10 PROBLEM — S60.922A: Status: RESOLVED | Noted: 2018-07-03 | Resolved: 2023-12-10

## 2023-12-10 PROBLEM — R63.6 UNDERWEIGHT: Status: RESOLVED | Noted: 2023-02-08 | Resolved: 2023-12-10

## 2023-12-10 PROBLEM — R63.4 WEIGHT LOSS: Status: RESOLVED | Noted: 2023-02-08 | Resolved: 2023-12-10

## 2023-12-10 PROBLEM — R05.9 COUGH: Status: RESOLVED | Noted: 2023-02-08 | Resolved: 2023-12-10

## 2023-12-10 PROBLEM — R53.1 GENERALIZED WEAKNESS: Status: RESOLVED | Noted: 2023-02-08 | Resolved: 2023-12-10

## 2023-12-10 PROBLEM — N39.0 UTI (URINARY TRACT INFECTION): Status: RESOLVED | Noted: 2023-02-08 | Resolved: 2023-12-10

## 2023-12-10 PROBLEM — K22.70 BARRETT ESOPHAGUS: Status: RESOLVED | Noted: 2023-02-08 | Resolved: 2023-12-10

## 2023-12-10 ASSESSMENT — ENCOUNTER SYMPTOMS
HEADACHES: 0
UNEXPECTED WEIGHT CHANGE: 0
CHEST TIGHTNESS: 0
ENDOCRINE NEGATIVE: 1
GASTROINTESTINAL NEGATIVE: 1
ABDOMINAL PAIN: 0
BLOOD IN STOOL: 0
EYES NEGATIVE: 1
DYSURIA: 0
DIARRHEA: 0
FATIGUE: 0
SHORTNESS OF BREATH: 0
DIFFICULTY URINATING: 0
COUGH: 0
DIZZINESS: 0
NAUSEA: 0
RESPIRATORY NEGATIVE: 1
HEMATOLOGIC/LYMPHATIC NEGATIVE: 1

## 2023-12-11 ENCOUNTER — DOCUMENTATION (OUTPATIENT)
Dept: PRIMARY CARE | Facility: CLINIC | Age: 88
End: 2023-12-11
Payer: MEDICARE

## 2023-12-11 DIAGNOSIS — I10 HYPERTENSION, UNSPECIFIED TYPE: ICD-10-CM

## 2023-12-11 DIAGNOSIS — F03.A3 MILD DEMENTIA WITH MOOD DISTURBANCE, UNSPECIFIED DEMENTIA TYPE (MULTI): ICD-10-CM

## 2023-12-11 PROCEDURE — 99490 CHRNC CARE MGMT STAFF 1ST 20: CPT | Performed by: INTERNAL MEDICINE

## 2023-12-11 NOTE — PROGRESS NOTES
Spoke with dtr who is managing pt's care. He is physically able to do various things around the house or outside independently, but is forgetful at times. Can have increased confusion and can also be irritable. Redirection works sometimes,  other times pt becomes argumentative.  Medication list updated and no changes noted. Dtr knows to call this nurse with any questions or concerns.

## 2023-12-18 DIAGNOSIS — K21.9 GASTROESOPHAGEAL REFLUX DISEASE, UNSPECIFIED WHETHER ESOPHAGITIS PRESENT: Primary | ICD-10-CM

## 2023-12-18 RX ORDER — FAMOTIDINE 20 MG/1
20 TABLET, FILM COATED ORAL 2 TIMES DAILY
Qty: 180 TABLET | Refills: 3 | Status: SHIPPED | OUTPATIENT
Start: 2023-12-18

## 2024-01-11 ENCOUNTER — PATIENT OUTREACH (OUTPATIENT)
Dept: PRIMARY CARE | Facility: CLINIC | Age: 89
End: 2024-01-11
Payer: MEDICARE

## 2024-01-11 DIAGNOSIS — F03.A3 MILD DEMENTIA WITH MOOD DISTURBANCE, UNSPECIFIED DEMENTIA TYPE (MULTI): ICD-10-CM

## 2024-01-11 DIAGNOSIS — I48.0 PAROXYSMAL ATRIAL FIBRILLATION (MULTI): ICD-10-CM

## 2024-01-11 PROCEDURE — 99490 CHRNC CARE MGMT STAFF 1ST 20: CPT | Performed by: INTERNAL MEDICINE

## 2024-01-11 NOTE — PROGRESS NOTES
Spoke with dtr who pt lives with. Pt has had no significant changes. Dementia comes and goes. Pt is quiet at times, not engaging with anyone... Compliant with taking meds. Ambulates without difficulty. Wanders at times and needs redirection. No changes to meds and no refills needed at this time. Dtr aware to call this nurse with any questions or concerns.

## 2024-01-22 ENCOUNTER — TELEPHONE (OUTPATIENT)
Dept: PRIMARY CARE | Facility: CLINIC | Age: 89
End: 2024-01-22
Payer: MEDICARE

## 2024-01-22 DIAGNOSIS — R39.9 URINARY SYMPTOM OR SIGN: ICD-10-CM

## 2024-01-22 DIAGNOSIS — R55 SYNCOPE AND COLLAPSE: Primary | ICD-10-CM

## 2024-01-22 NOTE — TELEPHONE ENCOUNTER
Pts daughter called stating that pt passed out and was drooling and unresponsive. The ambulance was called but pt got better and did not go to ER. She is concerned of an infection and would like blood work. Pt will be coming in for his wifes appt on 1.24.24.

## 2024-01-24 ENCOUNTER — LAB (OUTPATIENT)
Dept: LAB | Facility: LAB | Age: 89
End: 2024-01-24
Payer: MEDICARE

## 2024-01-24 ENCOUNTER — OFFICE VISIT (OUTPATIENT)
Dept: PRIMARY CARE | Facility: CLINIC | Age: 89
End: 2024-01-24
Payer: MEDICARE

## 2024-01-24 VITALS
BODY MASS INDEX: 21.51 KG/M2 | DIASTOLIC BLOOD PRESSURE: 67 MMHG | TEMPERATURE: 97.9 F | OXYGEN SATURATION: 95 % | SYSTOLIC BLOOD PRESSURE: 167 MMHG | HEART RATE: 69 BPM | HEIGHT: 64 IN | WEIGHT: 126 LBS

## 2024-01-24 DIAGNOSIS — I10 BENIGN ESSENTIAL HYPERTENSION: ICD-10-CM

## 2024-01-24 DIAGNOSIS — R39.9 URINARY SYMPTOM OR SIGN: ICD-10-CM

## 2024-01-24 DIAGNOSIS — F33.9 DEPRESSION, RECURRENT (CMS-HCC): ICD-10-CM

## 2024-01-24 DIAGNOSIS — D64.9 ANEMIA, UNSPECIFIED TYPE: ICD-10-CM

## 2024-01-24 DIAGNOSIS — I48.0 PAROXYSMAL ATRIAL FIBRILLATION (MULTI): ICD-10-CM

## 2024-01-24 DIAGNOSIS — N18.31 STAGE 3A CHRONIC KIDNEY DISEASE (MULTI): ICD-10-CM

## 2024-01-24 DIAGNOSIS — R55 SYNCOPE AND COLLAPSE: Primary | ICD-10-CM

## 2024-01-24 DIAGNOSIS — R55 SYNCOPE AND COLLAPSE: ICD-10-CM

## 2024-01-24 DIAGNOSIS — F03.A3 MILD DEMENTIA WITH MOOD DISTURBANCE, UNSPECIFIED DEMENTIA TYPE (MULTI): ICD-10-CM

## 2024-01-24 LAB
ALBUMIN SERPL BCP-MCNC: 3.6 G/DL (ref 3.4–5)
ALP SERPL-CCNC: 80 U/L (ref 33–136)
ALT SERPL W P-5'-P-CCNC: 23 U/L (ref 10–52)
ANION GAP SERPL CALC-SCNC: 10 MMOL/L (ref 10–20)
AST SERPL W P-5'-P-CCNC: 29 U/L (ref 9–39)
BASOPHILS # BLD AUTO: 0.05 X10*3/UL (ref 0–0.1)
BASOPHILS NFR BLD AUTO: 0.7 %
BILIRUB SERPL-MCNC: 0.4 MG/DL (ref 0–1.2)
BUN SERPL-MCNC: 31 MG/DL (ref 6–23)
CALCIUM SERPL-MCNC: 8.8 MG/DL (ref 8.6–10.3)
CHLORIDE SERPL-SCNC: 101 MMOL/L (ref 98–107)
CO2 SERPL-SCNC: 30 MMOL/L (ref 21–32)
CREAT SERPL-MCNC: 1.24 MG/DL (ref 0.5–1.3)
EGFRCR SERPLBLD CKD-EPI 2021: 53 ML/MIN/1.73M*2
EOSINOPHIL # BLD AUTO: 0.44 X10*3/UL (ref 0–0.4)
EOSINOPHIL NFR BLD AUTO: 6.5 %
ERYTHROCYTE [DISTWIDTH] IN BLOOD BY AUTOMATED COUNT: 14.3 % (ref 11.5–14.5)
GLUCOSE SERPL-MCNC: 95 MG/DL (ref 74–99)
HCT VFR BLD AUTO: 32.5 % (ref 41–52)
HGB BLD-MCNC: 9.9 G/DL (ref 13.5–17.5)
IMM GRANULOCYTES # BLD AUTO: 0.02 X10*3/UL (ref 0–0.5)
IMM GRANULOCYTES NFR BLD AUTO: 0.3 % (ref 0–0.9)
LYMPHOCYTES # BLD AUTO: 1.27 X10*3/UL (ref 0.8–3)
LYMPHOCYTES NFR BLD AUTO: 18.8 %
MCH RBC QN AUTO: 29.3 PG (ref 26–34)
MCHC RBC AUTO-ENTMCNC: 30.5 G/DL (ref 32–36)
MCV RBC AUTO: 96 FL (ref 80–100)
MONOCYTES # BLD AUTO: 0.82 X10*3/UL (ref 0.05–0.8)
MONOCYTES NFR BLD AUTO: 12.1 %
NEUTROPHILS # BLD AUTO: 4.15 X10*3/UL (ref 1.6–5.5)
NEUTROPHILS NFR BLD AUTO: 61.6 %
NRBC BLD-RTO: 0 /100 WBCS (ref 0–0)
PLATELET # BLD AUTO: 277 X10*3/UL (ref 150–450)
POTASSIUM SERPL-SCNC: 4.2 MMOL/L (ref 3.5–5.3)
PROT SERPL-MCNC: 7.3 G/DL (ref 6.4–8.2)
RBC # BLD AUTO: 3.38 X10*6/UL (ref 4.5–5.9)
SODIUM SERPL-SCNC: 137 MMOL/L (ref 136–145)
WBC # BLD AUTO: 6.8 X10*3/UL (ref 4.4–11.3)

## 2024-01-24 PROCEDURE — 83540 ASSAY OF IRON: CPT

## 2024-01-24 PROCEDURE — 93000 ELECTROCARDIOGRAM COMPLETE: CPT | Performed by: INTERNAL MEDICINE

## 2024-01-24 PROCEDURE — 1159F MED LIST DOCD IN RCRD: CPT | Performed by: INTERNAL MEDICINE

## 2024-01-24 PROCEDURE — 99214 OFFICE O/P EST MOD 30 MIN: CPT | Performed by: INTERNAL MEDICINE

## 2024-01-24 PROCEDURE — 87186 SC STD MICRODIL/AGAR DIL: CPT

## 2024-01-24 PROCEDURE — 3077F SYST BP >= 140 MM HG: CPT | Performed by: INTERNAL MEDICINE

## 2024-01-24 PROCEDURE — 1036F TOBACCO NON-USER: CPT | Performed by: INTERNAL MEDICINE

## 2024-01-24 PROCEDURE — 80053 COMPREHEN METABOLIC PANEL: CPT

## 2024-01-24 PROCEDURE — 1160F RVW MEDS BY RX/DR IN RCRD: CPT | Performed by: INTERNAL MEDICINE

## 2024-01-24 PROCEDURE — 3078F DIAST BP <80 MM HG: CPT | Performed by: INTERNAL MEDICINE

## 2024-01-24 PROCEDURE — 36415 COLL VENOUS BLD VENIPUNCTURE: CPT

## 2024-01-24 PROCEDURE — 83550 IRON BINDING TEST: CPT

## 2024-01-24 PROCEDURE — 87086 URINE CULTURE/COLONY COUNT: CPT

## 2024-01-24 PROCEDURE — 82746 ASSAY OF FOLIC ACID SERUM: CPT

## 2024-01-24 PROCEDURE — 82607 VITAMIN B-12: CPT

## 2024-01-24 PROCEDURE — 85025 COMPLETE CBC W/AUTO DIFF WBC: CPT

## 2024-01-24 ASSESSMENT — ENCOUNTER SYMPTOMS
DIFFICULTY URINATING: 0
FATIGUE: 0
DYSURIA: 0
DIZZINESS: 0
ABDOMINAL PAIN: 0
DIARRHEA: 0
COUGH: 0
CHEST TIGHTNESS: 0
NAUSEA: 0
RESPIRATORY NEGATIVE: 1
PSYCHIATRIC NEGATIVE: 1
GASTROINTESTINAL NEGATIVE: 1
BLOOD IN STOOL: 0
HEADACHES: 0
UNEXPECTED WEIGHT CHANGE: 0
ENDOCRINE NEGATIVE: 1
SHORTNESS OF BREATH: 0
HEMATOLOGIC/LYMPHATIC NEGATIVE: 1
EYES NEGATIVE: 1

## 2024-01-24 ASSESSMENT — PATIENT HEALTH QUESTIONNAIRE - PHQ9
1. LITTLE INTEREST OR PLEASURE IN DOING THINGS: NOT AT ALL
SUM OF ALL RESPONSES TO PHQ9 QUESTIONS 1 AND 2: 0
2. FEELING DOWN, DEPRESSED OR HOPELESS: NOT AT ALL

## 2024-01-24 NOTE — PROGRESS NOTES
Subjective   Patient ID: Sherri Grace is a 89 y.o. male who presents for Passed out (Patient's daughter called stating patient was found passed out, drooling, and unresponsive. ).    This is a 89-year-old patient was brought in today by his son-in-law because on 1/22/2024 he found him unresponsive after he had his breakfast, was sitting on the chair and saw him drooling and unresponsive, no seizure activity seen, he called 911 by the time they came to the home he came back on his own and he denied any complaint, his family noted that he was tired that day and was sleepy, but no cough shortness of breath fever chills diarrhea or other complaint.  Family told me that EMS found the patient send vital signs to be stable, he declined going to the ER, family decided to bring him today at his wife's appointment, I did order labs to be done after this visit.  He is known to have dementia, but no neurological changes no slurred speech blurry vision numbness or weakness anywhere that were noticed by the family or by patient.  he also has asymptomatic gallstones.has h/o A fib and CAD,s/p stent,  He sees Dr Reagan for his A Fib,on [facility].  he denies any CP or SOB.  he sees Dr Shelby for eye exam,up to date.  he has a living will.  He denies any depressed moods,no alcohol abuse.         Review of Systems   Constitutional:  Negative for fatigue and unexpected weight change.   HENT: Negative.  Negative for congestion.    Eyes: Negative.    Respiratory: Negative.  Negative for cough, chest tightness and shortness of breath.    Cardiovascular:  Negative for chest pain and leg swelling.   Gastrointestinal: Negative.  Negative for abdominal pain, blood in stool, diarrhea and nausea.   Endocrine: Negative.    Genitourinary: Negative.  Negative for difficulty urinating and dysuria.   Neurological:  Negative for dizziness and headaches.        As HPI   Hematological: Negative.    Psychiatric/Behavioral: Negative.         Objective  "  /67 (BP Location: Right arm, Patient Position: Sitting, BP Cuff Size: Adult)   Pulse 69   Temp 36.6 °C (97.9 °F) (Temporal)   Ht 1.613 m (5' 3.5\")   Wt 57.2 kg (126 lb)   SpO2 95%   BMI 21.97 kg/m²     Physical Exam  Vitals reviewed.   Constitutional:       General: He is not in acute distress.     Appearance: Normal appearance.   HENT:      Head: Normocephalic and atraumatic.   Eyes:      Extraocular Movements: Extraocular movements intact.      Pupils: Pupils are equal, round, and reactive to light.   Neck:      Vascular: No carotid bruit.   Cardiovascular:      Heart sounds: Normal heart sounds.      Comments: Irregularly irregular S1-S2.  Pulmonary:      Effort: Pulmonary effort is normal. No respiratory distress.      Breath sounds: Normal breath sounds. No wheezing or rhonchi.   Abdominal:      General: Abdomen is flat. Bowel sounds are normal.      Palpations: Abdomen is soft.      Tenderness: There is no abdominal tenderness.   Musculoskeletal:         General: Normal range of motion.      Cervical back: Normal range of motion and neck supple.   Lymphadenopathy:      Cervical: No cervical adenopathy.   Skin:     General: Skin is warm and dry.   Neurological:      General: No focal deficit present.      Mental Status: He is alert and oriented to person, place, and time.      Cranial Nerves: No cranial nerve deficit.      Motor: No weakness.      Coordination: Coordination normal.      Deep Tendon Reflexes: Reflexes normal.   Psychiatric:      Comments: Pleasantly demented cooperative with exam         Assessment/Plan   Problem List Items Addressed This Visit             ICD-10-CM    Atrial fibrillation (CMS/HCC) I48.91     Stable EKG done today show A-fib with controlled ventricular response.           Benign essential hypertension I10     Stable continue same medication.         Mild dementia (CMS/HCC) F03.A0     Seen by neurologist in past.         Depression, recurrent (CMS/HCC) F33.9     " Related to his dementia stable.         Syncope and collapse - Primary R55     Syncope and collapse occurred on 1/22/2024, EMS were called, patient came back on his own evaluation of EMS was normal, patient at that time declined to go to the ER.  I need to rule out any CVA or cardiac event.  EKG done today show A-fib with controlled ventricular response at 71 bpm.  Will send him for CBC CMP urine culture.  I will also order stat CT of head to rule out CVA.  I advised his family, his wife and his son-in-law who are present at the exam room at the office, that if the collapse happen again they need to call 911 and be taken to the ER immediately.  He also should follow-up with his cardiologist EPS Dr. Reagan to rule out any further arrhythmia.         Relevant Orders    CT head wo IV contrast    ECG 12 lead (Clinic Performed) (Completed)    Stage 3a chronic kidney disease (CMS/HCC) N18.31     Keep well-hydrated.  Monitor GFR.  Avoid NSAID.

## 2024-01-24 NOTE — ASSESSMENT & PLAN NOTE
Syncope and collapse occurred on 1/22/2024, EMS were called, patient came back on his own evaluation of EMS was normal, patient at that time declined to go to the ER.  I need to rule out any CVA or cardiac event.  EKG done today show A-fib with controlled ventricular response at 71 bpm.  Will send him for CBC CMP urine culture.  I will also order stat CT of head to rule out CVA.  I advised his family, his wife and his son-in-law who are present at the exam room at the office, that if the collapse happen again they need to call 911 and be taken to the ER immediately.  He also should follow-up with his cardiologist EPS Dr. Reagan to rule out any further arrhythmia.

## 2024-01-25 DIAGNOSIS — D63.8 ANEMIA, CHRONIC DISEASE: Primary | ICD-10-CM

## 2024-01-25 DIAGNOSIS — D64.9 ANEMIA, UNSPECIFIED TYPE: Primary | ICD-10-CM

## 2024-01-25 DIAGNOSIS — N39.0 ACUTE UTI: Primary | ICD-10-CM

## 2024-01-25 PROBLEM — N18.31 STAGE 3A CHRONIC KIDNEY DISEASE (MULTI): Status: ACTIVE | Noted: 2024-01-25

## 2024-01-25 LAB
FOLATE SERPL-MCNC: >22.3 NG/ML
IRON SATN MFR SERPL: 13 % (ref 25–45)
IRON SERPL-MCNC: 43 UG/DL (ref 35–150)
TIBC SERPL-MCNC: 339 UG/DL (ref 240–445)
UIBC SERPL-MCNC: 296 UG/DL (ref 110–370)
VIT B12 SERPL-MCNC: 373 PG/ML (ref 211–911)

## 2024-01-25 RX ORDER — AMOXICILLIN 250 MG/1
250 CAPSULE ORAL 2 TIMES DAILY
Qty: 14 CAPSULE | Refills: 0 | Status: SHIPPED | OUTPATIENT
Start: 2024-01-25 | End: 2024-02-01

## 2024-01-25 NOTE — RESULT ENCOUNTER NOTE
Please let patient's wife or patient since his daughter that patient has UTI, I sent the prescription to his pharmacy for antibiotic.  Blood test show anemia, I am requesting the lab to do anemia study on his blood specimen from yesterday, his kidney is little bit abnormal, advised patient to increase his oral water intake

## 2024-01-25 NOTE — RESULT ENCOUNTER NOTE
Please let patient wife over daughter know because patient has dementia as if his hemoglobin is only 9 , he is iron B12 and folate level were normal therefore I am referring him to hematologist for further evaluation.

## 2024-01-26 LAB — BACTERIA UR CULT: ABNORMAL

## 2024-01-29 ENCOUNTER — TELEPHONE (OUTPATIENT)
Dept: PRIMARY CARE | Facility: CLINIC | Age: 89
End: 2024-01-29
Payer: MEDICARE

## 2024-02-08 ENCOUNTER — APPOINTMENT (OUTPATIENT)
Dept: RADIOLOGY | Facility: CLINIC | Age: 89
End: 2024-02-08
Payer: MEDICARE

## 2024-02-09 ENCOUNTER — HOSPITAL ENCOUNTER (OUTPATIENT)
Dept: RADIOLOGY | Facility: CLINIC | Age: 89
Discharge: HOME | End: 2024-02-09
Payer: MEDICARE

## 2024-02-09 DIAGNOSIS — R55 SYNCOPE AND COLLAPSE: ICD-10-CM

## 2024-02-09 PROCEDURE — 70450 CT HEAD/BRAIN W/O DYE: CPT

## 2024-02-09 PROCEDURE — 70450 CT HEAD/BRAIN W/O DYE: CPT | Performed by: STUDENT IN AN ORGANIZED HEALTH CARE EDUCATION/TRAINING PROGRAM

## 2024-02-15 ENCOUNTER — PATIENT OUTREACH (OUTPATIENT)
Dept: PRIMARY CARE | Facility: CLINIC | Age: 89
End: 2024-02-15
Payer: MEDICARE

## 2024-02-15 DIAGNOSIS — F03.A3 MILD DEMENTIA WITH MOOD DISTURBANCE, UNSPECIFIED DEMENTIA TYPE (MULTI): ICD-10-CM

## 2024-02-15 DIAGNOSIS — I10 HYPERTENSION, UNSPECIFIED TYPE: ICD-10-CM

## 2024-02-15 PROCEDURE — 99490 CHRNC CARE MGMT STAFF 1ST 20: CPT | Performed by: INTERNAL MEDICINE

## 2024-02-15 NOTE — PROGRESS NOTES
Spoke with dtr who is active in pt's care. States he's been doing well. Had an episode last month that he fainted and was unresponsive. Ran several tests and nothing found per dtr. Has had no SOB or dizziness. Does have a congested cough. Not taking anything at this time.  He continues to need supervision for safety and has had no falls. No changes to meds and no refills needed at this time.

## 2024-03-14 ENCOUNTER — PATIENT OUTREACH (OUTPATIENT)
Dept: PRIMARY CARE | Facility: CLINIC | Age: 89
End: 2024-03-14
Payer: MEDICARE

## 2024-03-14 DIAGNOSIS — F03.A3 MILD DEMENTIA WITH MOOD DISTURBANCE, UNSPECIFIED DEMENTIA TYPE (MULTI): ICD-10-CM

## 2024-03-14 DIAGNOSIS — I10 HYPERTENSION, UNSPECIFIED TYPE: ICD-10-CM

## 2024-03-14 PROCEDURE — 99490 CHRNC CARE MGMT STAFF 1ST 20: CPT | Performed by: INTERNAL MEDICINE

## 2024-03-14 NOTE — PROGRESS NOTES
Spoke with dtr. Pt seems status quo. Cognition is still not good and dtr would like to get him further evaluated with Neuro, but has been so busy with her mom, its hard for her to follow up for pt. Continues to get around well and has had no falls. No changes to meds. Dtr aware to call this nurse with any concerns.

## 2024-03-20 DIAGNOSIS — I10 BENIGN ESSENTIAL HYPERTENSION: Primary | ICD-10-CM

## 2024-03-20 RX ORDER — LISINOPRIL 40 MG/1
TABLET ORAL
Qty: 90 TABLET | Refills: 3 | Status: SHIPPED | OUTPATIENT
Start: 2024-03-20

## 2024-03-25 ENCOUNTER — OFFICE VISIT (OUTPATIENT)
Dept: PRIMARY CARE | Facility: CLINIC | Age: 89
End: 2024-03-25
Payer: MEDICARE

## 2024-03-25 VITALS
SYSTOLIC BLOOD PRESSURE: 128 MMHG | WEIGHT: 128.6 LBS | HEART RATE: 77 BPM | BODY MASS INDEX: 22.42 KG/M2 | DIASTOLIC BLOOD PRESSURE: 70 MMHG | OXYGEN SATURATION: 98 %

## 2024-03-25 DIAGNOSIS — D50.8 IRON DEFICIENCY ANEMIA SECONDARY TO INADEQUATE DIETARY IRON INTAKE: ICD-10-CM

## 2024-03-25 DIAGNOSIS — R05.9 COUGH, UNSPECIFIED TYPE: ICD-10-CM

## 2024-03-25 DIAGNOSIS — F03.A3 MILD DEMENTIA WITH MOOD DISTURBANCE, UNSPECIFIED DEMENTIA TYPE (MULTI): ICD-10-CM

## 2024-03-25 DIAGNOSIS — N18.31 STAGE 3A CHRONIC KIDNEY DISEASE (MULTI): ICD-10-CM

## 2024-03-25 DIAGNOSIS — I48.0 PAROXYSMAL ATRIAL FIBRILLATION (MULTI): ICD-10-CM

## 2024-03-25 DIAGNOSIS — R05.2 SUBACUTE COUGH: ICD-10-CM

## 2024-03-25 DIAGNOSIS — I10 BENIGN ESSENTIAL HYPERTENSION: ICD-10-CM

## 2024-03-25 DIAGNOSIS — J34.89 RHINORRHEA: Primary | ICD-10-CM

## 2024-03-25 DIAGNOSIS — T14.8XXA BRUISE: ICD-10-CM

## 2024-03-25 PROBLEM — Z86.79 HISTORY OF CARDIAC ARRHYTHMIA: Status: ACTIVE | Noted: 2024-03-25

## 2024-03-25 PROBLEM — Z86.69 HISTORY OF DISORDER OF EYE REGION: Status: ACTIVE | Noted: 2024-03-25

## 2024-03-25 PROBLEM — Z86.79 HISTORY OF HYPERTENSION: Status: ACTIVE | Noted: 2024-03-25

## 2024-03-25 PROCEDURE — 99214 OFFICE O/P EST MOD 30 MIN: CPT | Performed by: INTERNAL MEDICINE

## 2024-03-25 PROCEDURE — 1160F RVW MEDS BY RX/DR IN RCRD: CPT | Performed by: INTERNAL MEDICINE

## 2024-03-25 PROCEDURE — 3074F SYST BP LT 130 MM HG: CPT | Performed by: INTERNAL MEDICINE

## 2024-03-25 PROCEDURE — 1159F MED LIST DOCD IN RCRD: CPT | Performed by: INTERNAL MEDICINE

## 2024-03-25 PROCEDURE — 1036F TOBACCO NON-USER: CPT | Performed by: INTERNAL MEDICINE

## 2024-03-25 PROCEDURE — 3078F DIAST BP <80 MM HG: CPT | Performed by: INTERNAL MEDICINE

## 2024-03-25 RX ORDER — IPRATROPIUM BROMIDE 42 UG/1
SPRAY, METERED NASAL
Qty: 15 ML | Refills: 1 | Status: SHIPPED | OUTPATIENT
Start: 2024-03-25

## 2024-03-25 ASSESSMENT — ENCOUNTER SYMPTOMS
GASTROINTESTINAL NEGATIVE: 1
CHEST TIGHTNESS: 0
ABDOMINAL PAIN: 0
HEADACHES: 0
SHORTNESS OF BREATH: 0
UNEXPECTED WEIGHT CHANGE: 0
NEUROLOGICAL NEGATIVE: 1
FATIGUE: 0
PSYCHIATRIC NEGATIVE: 1
DYSURIA: 0
NAUSEA: 0
BLOOD IN STOOL: 0
RESPIRATORY NEGATIVE: 1
EYES NEGATIVE: 1
ENDOCRINE NEGATIVE: 1
HEMATOLOGIC/LYMPHATIC NEGATIVE: 1
DIZZINESS: 0
DIARRHEA: 0
DIFFICULTY URINATING: 0

## 2024-03-25 NOTE — PROGRESS NOTES
Subjective   Patient ID: Sherri Grace is a 89 y.o. male who presents for Follow-up and Coughing during eating .    This is a 89-year-old patient was brought in today by his son-in-law for general check up,denies any new problems,they put his meds in a pill box and has been taking his medication regularly he denies any complaint or any change in his general medical condition, no recent fall.  We discussed his labs and her CT head he has anemia, workup is negative except for low iron saturation, he has an appointment to see hematologist soon, family started giving him some protein supplement.  They note that he has occasional cough while eating food but according to his daughter she said she noticed runny nose and then cough, no dysphagia to liquids,And that has been going on infrequently.  Patient himself he denies any complaint no recent fall according to him.   He did have an episode for which he saw me shortly after on 1/22/2024 they found him unresponsive after he had his breakfast, was sitting on the chair and saw him drooling and unresponsive, no seizure activity seen, he called 911 by the time they came to the home he came back on his own and he denied any complaint, his family noted that he was tired that day and was sleepy, but no cough shortness of breath fever chills diarrhea or other complaint.  Family told me that EMS found the patient send vital signs to be stable, he declined going to the ER, family decided to bring him today at his wife's appointment, I did order labs to be done after this visit.  He is known to have dementia, but no neurological changes no slurred speech blurry vision numbness or weakness anywhere that were noticed by the family or by patient.  he also has asymptomatic gallstones.has h/o A fib and CAD,s/p stent,  He sees Dr Reagan for his A Fib,on [facility].  he denies any CP or SOB.  he sees Dr Shelby for eye exam,up to date.  he has a living will.  He denies any depressed moods,no  alcohol abuse.         Review of Systems   Constitutional:  Negative for fatigue and unexpected weight change.   HENT: Negative.  Negative for congestion.         As HPI runny nose when eating.   Eyes: Negative.    Respiratory: Negative.  Negative for chest tightness and shortness of breath.         As HPI.   Cardiovascular:  Negative for chest pain and leg swelling.   Gastrointestinal: Negative.  Negative for abdominal pain, blood in stool, diarrhea and nausea.   Endocrine: Negative.    Genitourinary: Negative.  Negative for difficulty urinating and dysuria.   Neurological: Negative.  Negative for dizziness and headaches.   Hematological: Negative.    Psychiatric/Behavioral: Negative.         Objective   /70 (BP Location: Left arm, Patient Position: Sitting)   Pulse 77   Wt 58.3 kg (128 lb 9.6 oz)   SpO2 98%   BMI 22.42 kg/m²     Physical Exam  Vitals reviewed.   Constitutional:       General: He is not in acute distress.     Appearance: Normal appearance.   HENT:      Head:      Comments: Noted fading ecchymosis over the left front frontal area, patient denies any recent foreign, his family who are present at the office including his wife and his son-in-law and his daughter on the phone denies knowledge of any recent trauma, no change in his general condition or behavior.  Eyes:      Extraocular Movements: Extraocular movements intact.      Pupils: Pupils are equal, round, and reactive to light.   Neck:      Vascular: No carotid bruit.   Cardiovascular:      Heart sounds: Normal heart sounds.      Comments: Irregularly irregular S1-S2.  Pulmonary:      Effort: Pulmonary effort is normal. No respiratory distress.      Breath sounds: Normal breath sounds. No wheezing or rhonchi.   Abdominal:      General: Abdomen is flat. Bowel sounds are normal.      Palpations: Abdomen is soft.      Tenderness: There is no abdominal tenderness.   Musculoskeletal:         General: Normal range of motion.      Cervical  back: Normal range of motion and neck supple.   Lymphadenopathy:      Cervical: No cervical adenopathy.   Skin:     General: Skin is warm and dry.   Neurological:      General: No focal deficit present.      Mental Status: He is alert and oriented to person, place, and time.      Cranial Nerves: No cranial nerve deficit.      Motor: No weakness.      Coordination: Coordination normal.      Deep Tendon Reflexes: Reflexes normal.   Psychiatric:      Comments: Pleasantly demented cooperative with exam         Assessment/Plan   Problem List Items Addressed This Visit             ICD-10-CM    Atrial fibrillation (CMS/HCC) I48.91     A-fib with controlled ventricular response, stable continue same medication.  Follow-up with me in 6 months.         Benign essential hypertension I10     Stable.         Mild dementia (CMS/HCC) F03.A0    Stage 3a chronic kidney disease (CMS/HCC) N18.31    Bruise T14.8XXA     Ecchymosis left frontal area, family will monitor his symptoms and will call me if there is any change in his behavior then I can recommend CT head of the head and referral to neurologist to also further evaluate his history of syncope from earlier this year, patient will be turning 89 of age this June.         Subacute cough R05.2     Occasional cough when eating I gave patient a glass of water and was able to drink it, all without coughing or choking, cough could be due to postnasal drainage and rhinorrhea, will treat with Atrovent nasal spray they will call me if things get worse.         Iron deficiency anemia secondary to inadequate dietary iron intake D50.8     Anemia study done slightly low iron saturation otherwise is negative, can add 1 iron pill daily and keep his follow-up appointment.  Also evaluate for possible myelodysplastic syndrome.          Other Visit Diagnoses         Codes    Rhinorrhea    -  Primary J34.89    Relevant Medications    ipratropium (Atrovent) 42 mcg (0.06 %) nasal spray    Cough,  unspecified type     R05.9

## 2024-03-25 NOTE — ASSESSMENT & PLAN NOTE
Anemia study done slightly low iron saturation otherwise is negative, can add 1 iron pill daily and keep his follow-up appointment.  Also evaluate for possible myelodysplastic syndrome.

## 2024-03-25 NOTE — ASSESSMENT & PLAN NOTE
Occasional cough when eating I gave patient a glass of water and was able to drink it, all without coughing or choking, cough could be due to postnasal drainage and rhinorrhea, will treat with Atrovent nasal spray they will call me if things get worse.

## 2024-03-25 NOTE — ASSESSMENT & PLAN NOTE
A-fib with controlled ventricular response, stable continue same medication.  Follow-up with me in 6 months.

## 2024-04-12 ENCOUNTER — PATIENT OUTREACH (OUTPATIENT)
Dept: PRIMARY CARE | Facility: CLINIC | Age: 89
End: 2024-04-12
Payer: MEDICARE

## 2024-04-12 DIAGNOSIS — R51.9 FACIAL PAIN: Primary | ICD-10-CM

## 2024-04-12 DIAGNOSIS — I10 HYPERTENSION, UNSPECIFIED TYPE: ICD-10-CM

## 2024-04-12 DIAGNOSIS — F03.A3 MILD DEMENTIA WITH MOOD DISTURBANCE, UNSPECIFIED DEMENTIA TYPE (MULTI): ICD-10-CM

## 2024-04-12 PROCEDURE — 99490 CHRNC CARE MGMT STAFF 1ST 20: CPT | Performed by: INTERNAL MEDICINE

## 2024-04-12 NOTE — PROGRESS NOTES
Spoke with dtr. States pt had a unwitnessed fall. He has no recollection of the fall and knows no details, but keeps holding right side and c/o pain in that area. Dtr requesting an xray if possible. Will send a request to PCP. No changes noted to meds and no refills needed at this time. Aware to call this nurse with any questions or concerns. Reminded of appt this month on 4/24.

## 2024-04-15 DIAGNOSIS — N40.0 BENIGN PROSTATIC HYPERPLASIA, UNSPECIFIED WHETHER LOWER URINARY TRACT SYMPTOMS PRESENT: ICD-10-CM

## 2024-04-15 RX ORDER — TAMSULOSIN HYDROCHLORIDE 0.4 MG/1
CAPSULE ORAL
Qty: 90 CAPSULE | Refills: 3 | Status: SHIPPED | OUTPATIENT
Start: 2024-04-15

## 2024-04-24 ENCOUNTER — APPOINTMENT (OUTPATIENT)
Dept: PRIMARY CARE | Facility: CLINIC | Age: 89
End: 2024-04-24
Payer: MEDICARE

## 2024-04-29 DIAGNOSIS — R35.0 URINARY FREQUENCY: Primary | ICD-10-CM

## 2024-04-29 NOTE — PROGRESS NOTES
Patient ID: Sherri Grace is a 89 y.o. male.  Referring Physician: Althea Brown MD  [address]  [facility]  [place] [phone]  Primary Care Provider: Althea Brown MD      Subjective    HPI  Mr. Sherri Grace is a 89 y/o M presenting for initial consultation at the request of Dr. Althea Brown for iron deficiency anemia.    Most recently Hbg dropped below 9.    Patient denies any pain or SOB. States he could run now. He lives with his wife.     Son is present at visit today and states his wife and himself take care of both patient and his wife and they also have an aide that comes in to help. Patient and wife are never left alone.     Patient denies any chest pressure or pain. No other complaints of any at this time.    Patient is Jehovah Witness and declines any blood transfusions.     Patient's past medical history, surgical history, family history and social history reviewed.    Objective     Vitals:    04/30/24 1314   BP: 173/78   Pulse: 83   Resp: 17   Temp: 36.2 °C (97.2 °F)   SpO2: 96%       Review of Systems:   Review of Systems:    Positive per HPI, otherwise negative.     Physical Exam  Gen: appears well in clinic, NAD  HEENT: atraumatic head, normocephalic, EOMI, conjunctiva normal  LUNG: no increased WOB, CTAB  CV: No JVD. RRR  GI: soft, NT, ND  LE: no LE edema  Skin: no obvious rashes or lesions on visible skin  Neuro: interactive, no focal deficits noted  Psych: normal mood and affect    Performance Status:  Symptomatic; fully ambulatory    Labs/Imaging/Pathology: personally reviewed reports and images in Epic electronic medical record system. Pertinent results as it related to the plan represented in below in assessment and plan.     Assessment/Plan    Iron deficiency anemia  - Most recent blood work on 4/24/24 shows Hbg 9.9, previously in the 11 range.   - He is also iron deficient and borderline B 12.  - We discussed different etiologies of anemia including an  occcult GI bleed vs. Primary bone marrow dysfunction.  - Pateint is on Eliquis and we discussed if the Hbg continues to drop we would recommend possibly holding the Eliquis.  - We did discuss blood work today and pateint states he does follow closely with Dr. Brown and will get blood work with her.   - We did discuss different treatments to increase the Hbg in which patient declines at this time.  - Patient is Jehovah Witness and declines any blood transfusion.  - We discussed starting with oral iron and B 12 and repeating labs in 3-6 months which patient is agreeable to and will plan to get repeat labs when he goes to see Dr. Brown.   - Did discuss that if anemia continues there are different ways of trying to bump up Hbg with IV iron vs. Aranesp to avoid the need for a blood transfusion.   - Son and patient both agree that they will call with any worsening symptoms or concerns.  - RTC PRN.     RTC PRN. This note has been transcribed using a medical scribe and there is a possibility of unintentional typing misprints.    Diagnoses and all orders for this visit:  Anemia, chronic disease  -     Referral to Hematology and Oncology  -     ferrous sulfate 325 (65 Fe) MG EC tablet; Take 1 tablet by mouth every other day. Do not crush, chew, or split.  -     cyanocobalamin (Vitamin B-12) 500 mcg tablet; Take 1 tablet (500 mcg) by mouth once daily.     Time Spent  Prep time on day of patient encounter: 5 minutes  Time spent directly with patient, family or caregiver: 30 minutes  Additional Time Spent on Patient Care Activities: 5 minutes  Documentation Time: 5 minutes  Other Time Spent: 0 minutes  Total: 45 minutes          Lucy Ugarte MD  Hematology/Oncology  [facility]    Jenniferibhamida Attestation  By signing my name below, IAudra Scribe attest that this documentation has been prepared under the direction and in the presence of Lucy Ugarte MD.

## 2024-04-30 ENCOUNTER — OFFICE VISIT (OUTPATIENT)
Dept: HEMATOLOGY/ONCOLOGY | Facility: CLINIC | Age: 89
End: 2024-04-30
Payer: MEDICARE

## 2024-04-30 ENCOUNTER — LAB (OUTPATIENT)
Dept: LAB | Facility: CLINIC | Age: 89
End: 2024-04-30
Payer: MEDICARE

## 2024-04-30 VITALS
BODY MASS INDEX: 25.67 KG/M2 | SYSTOLIC BLOOD PRESSURE: 173 MMHG | OXYGEN SATURATION: 96 % | HEIGHT: 60 IN | HEART RATE: 83 BPM | WEIGHT: 130.73 LBS | DIASTOLIC BLOOD PRESSURE: 78 MMHG | TEMPERATURE: 97.2 F | RESPIRATION RATE: 17 BRPM

## 2024-04-30 DIAGNOSIS — R31.29 MICROSCOPIC HEMATURIA: Primary | ICD-10-CM

## 2024-04-30 DIAGNOSIS — R35.0 URINARY FREQUENCY: ICD-10-CM

## 2024-04-30 DIAGNOSIS — D63.8 ANEMIA, CHRONIC DISEASE: ICD-10-CM

## 2024-04-30 LAB
APPEARANCE UR: ABNORMAL
BACTERIA #/AREA URNS AUTO: ABNORMAL /HPF
BILIRUB UR STRIP.AUTO-MCNC: NEGATIVE MG/DL
COLOR UR: YELLOW
GLUCOSE UR STRIP.AUTO-MCNC: NORMAL MG/DL
KETONES UR STRIP.AUTO-MCNC: NEGATIVE MG/DL
LEUKOCYTE ESTERASE UR QL STRIP.AUTO: ABNORMAL
NITRITE UR QL STRIP.AUTO: NEGATIVE
PH UR STRIP.AUTO: 5.5 [PH]
PROT UR STRIP.AUTO-MCNC: ABNORMAL MG/DL
RBC # UR STRIP.AUTO: ABNORMAL /UL
RBC #/AREA URNS AUTO: >20 /HPF
SP GR UR STRIP.AUTO: 1.02
UROBILINOGEN UR STRIP.AUTO-MCNC: NORMAL MG/DL
WBC #/AREA URNS AUTO: >50 /HPF

## 2024-04-30 PROCEDURE — 1160F RVW MEDS BY RX/DR IN RCRD: CPT | Performed by: INTERNAL MEDICINE

## 2024-04-30 PROCEDURE — 1126F AMNT PAIN NOTED NONE PRSNT: CPT | Performed by: INTERNAL MEDICINE

## 2024-04-30 PROCEDURE — 3077F SYST BP >= 140 MM HG: CPT | Performed by: INTERNAL MEDICINE

## 2024-04-30 PROCEDURE — 99203 OFFICE O/P NEW LOW 30 MIN: CPT | Performed by: INTERNAL MEDICINE

## 2024-04-30 PROCEDURE — 99213 OFFICE O/P EST LOW 20 MIN: CPT | Performed by: INTERNAL MEDICINE

## 2024-04-30 PROCEDURE — 87086 URINE CULTURE/COLONY COUNT: CPT

## 2024-04-30 PROCEDURE — 81001 URINALYSIS AUTO W/SCOPE: CPT

## 2024-04-30 PROCEDURE — 1159F MED LIST DOCD IN RCRD: CPT | Performed by: INTERNAL MEDICINE

## 2024-04-30 PROCEDURE — 3078F DIAST BP <80 MM HG: CPT | Performed by: INTERNAL MEDICINE

## 2024-04-30 RX ORDER — UBIDECARENONE 75 MG
500 CAPSULE ORAL DAILY
Qty: 30 TABLET | Refills: 11 | Status: SHIPPED | OUTPATIENT
Start: 2024-04-30 | End: 2025-04-30

## 2024-04-30 RX ORDER — FERROUS SULFATE 325(65) MG
325 TABLET, DELAYED RELEASE (ENTERIC COATED) ORAL EVERY OTHER DAY
Qty: 45 TABLET | Refills: 3 | Status: SHIPPED | OUTPATIENT
Start: 2024-04-30 | End: 2025-04-30

## 2024-04-30 ASSESSMENT — ENCOUNTER SYMPTOMS
LOSS OF SENSATION IN FEET: 0
DEPRESSION: 0
OCCASIONAL FEELINGS OF UNSTEADINESS: 0

## 2024-04-30 ASSESSMENT — PAIN SCALES - GENERAL: PAINLEVEL: 0-NO PAIN

## 2024-04-30 ASSESSMENT — COLUMBIA-SUICIDE SEVERITY RATING SCALE - C-SSRS
6. HAVE YOU EVER DONE ANYTHING, STARTED TO DO ANYTHING, OR PREPARED TO DO ANYTHING TO END YOUR LIFE?: NO
2. HAVE YOU ACTUALLY HAD ANY THOUGHTS OF KILLING YOURSELF?: NO
1. IN THE PAST MONTH, HAVE YOU WISHED YOU WERE DEAD OR WISHED YOU COULD GO TO SLEEP AND NOT WAKE UP?: NO

## 2024-05-01 NOTE — RESULT ENCOUNTER NOTE
Please let patient's daughter know that her father's urinalysis is suggestive of possible UTI.  I am referring him to a urologist.    I am waiting on the final urine culture result before sending antibiotic antibiotic .

## 2024-05-02 DIAGNOSIS — N39.0 ACUTE UTI: Primary | ICD-10-CM

## 2024-05-02 RX ORDER — NITROFURANTOIN 25; 75 MG/1; MG/1
100 CAPSULE ORAL 2 TIMES DAILY
Qty: 14 CAPSULE | Refills: 0 | Status: SHIPPED | OUTPATIENT
Start: 2024-05-02 | End: 2024-05-09

## 2024-05-03 LAB — BACTERIA UR CULT: ABNORMAL

## 2024-05-13 ENCOUNTER — PATIENT OUTREACH (OUTPATIENT)
Dept: PRIMARY CARE | Facility: CLINIC | Age: 89
End: 2024-05-13
Payer: MEDICARE

## 2024-05-13 DIAGNOSIS — F03.A3 MILD DEMENTIA WITH MOOD DISTURBANCE, UNSPECIFIED DEMENTIA TYPE (MULTI): ICD-10-CM

## 2024-05-13 DIAGNOSIS — I10 HYPERTENSION, UNSPECIFIED TYPE: ICD-10-CM

## 2024-05-13 PROCEDURE — 99490 CHRNC CARE MGMT STAFF 1ST 20: CPT | Performed by: INTERNAL MEDICINE

## 2024-05-13 NOTE — PROGRESS NOTES
Spoke with dtr. Pt is doing well, denies rib pain and shows no more grimmacing from recent fall. Also no c/o urinary discomf at this time. Continues to need supervision for safety. Dtr assists with medication management d/t poor congnition and pt has been compliant. Advised to call this nurse with any questions or concerns.

## 2024-05-31 ENCOUNTER — APPOINTMENT (OUTPATIENT)
Dept: RADIOLOGY | Facility: HOSPITAL | Age: 89
End: 2024-05-31
Payer: MEDICARE

## 2024-05-31 ENCOUNTER — APPOINTMENT (OUTPATIENT)
Dept: CARDIOLOGY | Facility: HOSPITAL | Age: 89
End: 2024-05-31
Payer: MEDICARE

## 2024-05-31 ENCOUNTER — HOSPITAL ENCOUNTER (OUTPATIENT)
Facility: HOSPITAL | Age: 89
Setting detail: OBSERVATION
Discharge: HOME | End: 2024-06-01
Attending: STUDENT IN AN ORGANIZED HEALTH CARE EDUCATION/TRAINING PROGRAM | Admitting: STUDENT IN AN ORGANIZED HEALTH CARE EDUCATION/TRAINING PROGRAM
Payer: MEDICARE
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
## 2024-05-31 DIAGNOSIS — S52.571A OTHER CLOSED INTRA-ARTICULAR FRACTURE OF DISTAL END OF RIGHT RADIUS, INITIAL ENCOUNTER: ICD-10-CM

## 2024-05-31 LAB
ALBUMIN SERPL BCP-MCNC: 3.9 G/DL (ref 3.4–5)
ALP SERPL-CCNC: 70 U/L (ref 33–136)
ALT SERPL W P-5'-P-CCNC: 23 U/L (ref 10–52)
ANION GAP SERPL CALC-SCNC: 13 MMOL/L (ref 10–20)
AST SERPL W P-5'-P-CCNC: 57 U/L (ref 9–39)
BASOPHILS # BLD AUTO: 0.04 X10*3/UL (ref 0–0.1)
BASOPHILS NFR BLD AUTO: 0.4 %
BILIRUB SERPL-MCNC: 0.6 MG/DL (ref 0–1.2)
BUN SERPL-MCNC: 30 MG/DL (ref 6–23)
CALCIUM SERPL-MCNC: 8.9 MG/DL (ref 8.6–10.3)
CARDIAC TROPONIN I PNL SERPL HS: 58 NG/L (ref 0–20)
CARDIAC TROPONIN I PNL SERPL HS: 65 NG/L (ref 0–20)
CHLORIDE SERPL-SCNC: 101 MMOL/L (ref 98–107)
CO2 SERPL-SCNC: 26 MMOL/L (ref 21–32)
CREAT SERPL-MCNC: 1.42 MG/DL (ref 0.5–1.3)
EGFRCR SERPLBLD CKD-EPI 2021: 45 ML/MIN/1.73M*2
EOSINOPHIL # BLD AUTO: 0.11 X10*3/UL (ref 0–0.4)
EOSINOPHIL NFR BLD AUTO: 1.2 %
ERYTHROCYTE [DISTWIDTH] IN BLOOD BY AUTOMATED COUNT: 15.8 % (ref 11.5–14.5)
GLUCOSE SERPL-MCNC: 91 MG/DL (ref 74–99)
HCT VFR BLD AUTO: 34 % (ref 41–52)
HGB BLD-MCNC: 10.6 G/DL (ref 13.5–17.5)
HOLD SPECIMEN: NORMAL
IMM GRANULOCYTES # BLD AUTO: 0.02 X10*3/UL (ref 0–0.5)
IMM GRANULOCYTES NFR BLD AUTO: 0.2 % (ref 0–0.9)
LYMPHOCYTES # BLD AUTO: 1.46 X10*3/UL (ref 0.8–3)
LYMPHOCYTES NFR BLD AUTO: 15.6 %
MAGNESIUM SERPL-MCNC: 2.34 MG/DL (ref 1.6–2.4)
MCH RBC QN AUTO: 29 PG (ref 26–34)
MCHC RBC AUTO-ENTMCNC: 31.2 G/DL (ref 32–36)
MCV RBC AUTO: 93 FL (ref 80–100)
MONOCYTES # BLD AUTO: 1.07 X10*3/UL (ref 0.05–0.8)
MONOCYTES NFR BLD AUTO: 11.4 %
NEUTROPHILS # BLD AUTO: 6.67 X10*3/UL (ref 1.6–5.5)
NEUTROPHILS NFR BLD AUTO: 71.2 %
NRBC BLD-RTO: 0 /100 WBCS (ref 0–0)
PLATELET # BLD AUTO: 332 X10*3/UL (ref 150–450)
POTASSIUM SERPL-SCNC: 5.3 MMOL/L (ref 3.5–5.3)
PROT SERPL-MCNC: 8.3 G/DL (ref 6.4–8.2)
RBC # BLD AUTO: 3.65 X10*6/UL (ref 4.5–5.9)
SODIUM SERPL-SCNC: 135 MMOL/L (ref 136–145)
WBC # BLD AUTO: 9.4 X10*3/UL (ref 4.4–11.3)

## 2024-05-31 PROCEDURE — 25605 CLTX DST RDL FX/EPHYS SEP W/: CPT | Mod: RT

## 2024-05-31 PROCEDURE — 2500000004 HC RX 250 GENERAL PHARMACY W/ HCPCS (ALT 636 FOR OP/ED): Performed by: STUDENT IN AN ORGANIZED HEALTH CARE EDUCATION/TRAINING PROGRAM

## 2024-05-31 PROCEDURE — 36415 COLL VENOUS BLD VENIPUNCTURE: CPT | Performed by: STUDENT IN AN ORGANIZED HEALTH CARE EDUCATION/TRAINING PROGRAM

## 2024-05-31 PROCEDURE — 83735 ASSAY OF MAGNESIUM: CPT

## 2024-05-31 PROCEDURE — 85025 COMPLETE CBC W/AUTO DIFF WBC: CPT

## 2024-05-31 PROCEDURE — 70450 CT HEAD/BRAIN W/O DYE: CPT

## 2024-05-31 PROCEDURE — 73100 X-RAY EXAM OF WRIST: CPT | Mod: RT

## 2024-05-31 PROCEDURE — 93005 ELECTROCARDIOGRAM TRACING: CPT | Mod: 59

## 2024-05-31 PROCEDURE — 73100 X-RAY EXAM OF WRIST: CPT | Mod: RIGHT SIDE | Performed by: RADIOLOGY

## 2024-05-31 PROCEDURE — 99285 EMERGENCY DEPT VISIT HI MDM: CPT

## 2024-05-31 PROCEDURE — 93005 ELECTROCARDIOGRAM TRACING: CPT

## 2024-05-31 PROCEDURE — 73110 X-RAY EXAM OF WRIST: CPT | Mod: RT

## 2024-05-31 PROCEDURE — 2500000005 HC RX 250 GENERAL PHARMACY W/O HCPCS

## 2024-05-31 PROCEDURE — 2500000004 HC RX 250 GENERAL PHARMACY W/ HCPCS (ALT 636 FOR OP/ED)

## 2024-05-31 PROCEDURE — 2500000005 HC RX 250 GENERAL PHARMACY W/O HCPCS: Performed by: STUDENT IN AN ORGANIZED HEALTH CARE EDUCATION/TRAINING PROGRAM

## 2024-05-31 PROCEDURE — 73110 X-RAY EXAM OF WRIST: CPT | Mod: RIGHT SIDE | Performed by: RADIOLOGY

## 2024-05-31 PROCEDURE — 70450 CT HEAD/BRAIN W/O DYE: CPT | Performed by: RADIOLOGY

## 2024-05-31 PROCEDURE — 84484 ASSAY OF TROPONIN QUANT: CPT | Mod: 91 | Performed by: STUDENT IN AN ORGANIZED HEALTH CARE EDUCATION/TRAINING PROGRAM

## 2024-05-31 PROCEDURE — 80053 COMPREHEN METABOLIC PANEL: CPT

## 2024-05-31 PROCEDURE — 36415 COLL VENOUS BLD VENIPUNCTURE: CPT

## 2024-05-31 PROCEDURE — 84484 ASSAY OF TROPONIN QUANT: CPT

## 2024-05-31 RX ORDER — LIDOCAINE HYDROCHLORIDE 20 MG/ML
10 INJECTION, SOLUTION EPIDURAL; INFILTRATION; INTRACAUDAL; PERINEURAL ONCE
Status: COMPLETED | OUTPATIENT
Start: 2024-05-31 | End: 2024-05-31

## 2024-05-31 RX ORDER — NAPROXEN SODIUM 220 MG/1
324 TABLET, FILM COATED ORAL ONCE
Status: COMPLETED | OUTPATIENT
Start: 2024-05-31 | End: 2024-06-01

## 2024-05-31 RX ORDER — BUPIVACAINE HYDROCHLORIDE 5 MG/ML
20 INJECTION, SOLUTION EPIDURAL; INTRACAUDAL ONCE
Status: COMPLETED | OUTPATIENT
Start: 2024-05-31 | End: 2024-05-31

## 2024-05-31 RX ADMIN — BUPIVACAINE HYDROCHLORIDE 100 MG: 5 INJECTION, SOLUTION EPIDURAL; INTRACAUDAL; PERINEURAL at 21:31

## 2024-05-31 RX ADMIN — BUPIVACAINE HYDROCHLORIDE 100 MG: 5 INJECTION, SOLUTION EPIDURAL; INTRACAUDAL; PERINEURAL at 22:04

## 2024-05-31 RX ADMIN — LIDOCAINE HYDROCHLORIDE 200 MG: 20 INJECTION, SOLUTION EPIDURAL; INFILTRATION; INTRACAUDAL; PERINEURAL at 22:04

## 2024-05-31 RX ADMIN — LIDOCAINE HYDROCHLORIDE 200 MG: 20 INJECTION, SOLUTION EPIDURAL; INFILTRATION; INTRACAUDAL; PERINEURAL at 21:31

## 2024-05-31 ASSESSMENT — PAIN - FUNCTIONAL ASSESSMENT: PAIN_FUNCTIONAL_ASSESSMENT: 0-10

## 2024-05-31 ASSESSMENT — PAIN DESCRIPTION - LOCATION: LOCATION: WRIST

## 2024-05-31 ASSESSMENT — COLUMBIA-SUICIDE SEVERITY RATING SCALE - C-SSRS
6. HAVE YOU EVER DONE ANYTHING, STARTED TO DO ANYTHING, OR PREPARED TO DO ANYTHING TO END YOUR LIFE?: NO
1. IN THE PAST MONTH, HAVE YOU WISHED YOU WERE DEAD OR WISHED YOU COULD GO TO SLEEP AND NOT WAKE UP?: NO
2. HAVE YOU ACTUALLY HAD ANY THOUGHTS OF KILLING YOURSELF?: NO

## 2024-05-31 ASSESSMENT — PAIN DESCRIPTION - PAIN TYPE: TYPE: ACUTE PAIN

## 2024-05-31 ASSESSMENT — PAIN DESCRIPTION - ORIENTATION: ORIENTATION: RIGHT

## 2024-05-31 ASSESSMENT — PAIN SCALES - GENERAL
PAINLEVEL_OUTOF10: 5 - MODERATE PAIN
PAINLEVEL_OUTOF10: 5 - MODERATE PAIN

## 2024-06-01 ENCOUNTER — APPOINTMENT (OUTPATIENT)
Dept: CARDIOLOGY | Facility: HOSPITAL | Age: 89
End: 2024-06-01
Payer: MEDICARE

## 2024-06-01 VITALS
TEMPERATURE: 97.5 F | SYSTOLIC BLOOD PRESSURE: 112 MMHG | RESPIRATION RATE: 18 BRPM | BODY MASS INDEX: 23.64 KG/M2 | OXYGEN SATURATION: 96 % | WEIGHT: 125.22 LBS | DIASTOLIC BLOOD PRESSURE: 52 MMHG | HEART RATE: 79 BPM | HEIGHT: 61 IN

## 2024-06-01 PROBLEM — S52.501A CLOSED FRACTURE OF DISTAL END OF RIGHT RADIUS: Status: ACTIVE | Noted: 2024-06-01
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
## 2024-06-01 LAB
ANION GAP SERPL CALC-SCNC: 12 MMOL/L (ref 10–20)
ATRIAL RATE: 68 BPM
ATRIAL RATE: 77 BPM
BUN SERPL-MCNC: 35 MG/DL (ref 6–23)
CALCIUM SERPL-MCNC: 8.5 MG/DL (ref 8.6–10.3)
CARDIAC TROPONIN I PNL SERPL HS: 65 NG/L (ref 0–20)
CHLORIDE SERPL-SCNC: 106 MMOL/L (ref 98–107)
CO2 SERPL-SCNC: 26 MMOL/L (ref 21–32)
CREAT SERPL-MCNC: 1.4 MG/DL (ref 0.5–1.3)
EGFRCR SERPLBLD CKD-EPI 2021: 45 ML/MIN/1.73M*2
ERYTHROCYTE [DISTWIDTH] IN BLOOD BY AUTOMATED COUNT: 15.6 % (ref 11.5–14.5)
GLUCOSE SERPL-MCNC: 93 MG/DL (ref 74–99)
HCT VFR BLD AUTO: 31 % (ref 41–52)
HGB BLD-MCNC: 9.4 G/DL (ref 13.5–17.5)
MCH RBC QN AUTO: 28.7 PG (ref 26–34)
MCHC RBC AUTO-ENTMCNC: 30.3 G/DL (ref 32–36)
MCV RBC AUTO: 95 FL (ref 80–100)
NRBC BLD-RTO: 0 /100 WBCS (ref 0–0)
PLATELET # BLD AUTO: 258 X10*3/UL (ref 150–450)
POTASSIUM SERPL-SCNC: 3.7 MMOL/L (ref 3.5–5.3)
Q ONSET: 218 MS
Q ONSET: 221 MS
QRS COUNT: 11 BEATS
QRS COUNT: 12 BEATS
QRS DURATION: 78 MS
QRS DURATION: 80 MS
QT INTERVAL: 386 MS
QT INTERVAL: 398 MS
QTC CALCULATION(BAZETT): 425 MS
QTC CALCULATION(BAZETT): 429 MS
QTC FREDERICIA: 412 MS
QTC FREDERICIA: 419 MS
R AXIS: 56 DEGREES
R AXIS: 68 DEGREES
RBC # BLD AUTO: 3.27 X10*6/UL (ref 4.5–5.9)
SODIUM SERPL-SCNC: 140 MMOL/L (ref 136–145)
T AXIS: 39 DEGREES
T AXIS: 66 DEGREES
T OFFSET: 411 MS
T OFFSET: 420 MS
VENTRICULAR RATE: 70 BPM
VENTRICULAR RATE: 73 BPM
WBC # BLD AUTO: 7.7 X10*3/UL (ref 4.4–11.3)

## 2024-06-01 PROCEDURE — 97165 OT EVAL LOW COMPLEX 30 MIN: CPT | Mod: GO | Performed by: OCCUPATIONAL THERAPIST

## 2024-06-01 PROCEDURE — 96361 HYDRATE IV INFUSION ADD-ON: CPT

## 2024-06-01 PROCEDURE — 2500000001 HC RX 250 WO HCPCS SELF ADMINISTERED DRUGS (ALT 637 FOR MEDICARE OP): Performed by: STUDENT IN AN ORGANIZED HEALTH CARE EDUCATION/TRAINING PROGRAM

## 2024-06-01 PROCEDURE — 2500000001 HC RX 250 WO HCPCS SELF ADMINISTERED DRUGS (ALT 637 FOR MEDICARE OP)

## 2024-06-01 PROCEDURE — 36415 COLL VENOUS BLD VENIPUNCTURE: CPT | Performed by: STUDENT IN AN ORGANIZED HEALTH CARE EDUCATION/TRAINING PROGRAM

## 2024-06-01 PROCEDURE — 96360 HYDRATION IV INFUSION INIT: CPT

## 2024-06-01 PROCEDURE — 84484 ASSAY OF TROPONIN QUANT: CPT | Performed by: STUDENT IN AN ORGANIZED HEALTH CARE EDUCATION/TRAINING PROGRAM

## 2024-06-01 PROCEDURE — 99222 1ST HOSP IP/OBS MODERATE 55: CPT | Performed by: ORTHOPAEDIC SURGERY

## 2024-06-01 PROCEDURE — 99239 HOSP IP/OBS DSCHRG MGMT >30: CPT | Performed by: STUDENT IN AN ORGANIZED HEALTH CARE EDUCATION/TRAINING PROGRAM

## 2024-06-01 PROCEDURE — G0378 HOSPITAL OBSERVATION PER HR: HCPCS

## 2024-06-01 PROCEDURE — 99222 1ST HOSP IP/OBS MODERATE 55: CPT | Performed by: STUDENT IN AN ORGANIZED HEALTH CARE EDUCATION/TRAINING PROGRAM

## 2024-06-01 PROCEDURE — 80048 BASIC METABOLIC PNL TOTAL CA: CPT | Performed by: STUDENT IN AN ORGANIZED HEALTH CARE EDUCATION/TRAINING PROGRAM

## 2024-06-01 PROCEDURE — 85027 COMPLETE CBC AUTOMATED: CPT | Performed by: STUDENT IN AN ORGANIZED HEALTH CARE EDUCATION/TRAINING PROGRAM

## 2024-06-01 PROCEDURE — 2500000004 HC RX 250 GENERAL PHARMACY W/ HCPCS (ALT 636 FOR OP/ED): Performed by: STUDENT IN AN ORGANIZED HEALTH CARE EDUCATION/TRAINING PROGRAM

## 2024-06-01 PROCEDURE — 1200000002 HC GENERAL ROOM WITH TELEMETRY DAILY

## 2024-06-01 RX ORDER — POLYETHYLENE GLYCOL 3350 17 G/17G
17 POWDER, FOR SOLUTION ORAL DAILY PRN
Status: DISCONTINUED | OUTPATIENT
Start: 2024-06-01 | End: 2024-06-01 | Stop reason: HOSPADM

## 2024-06-01 RX ORDER — ACETAMINOPHEN 325 MG/1
650 TABLET ORAL EVERY 4 HOURS PRN
Status: DISCONTINUED | OUTPATIENT
Start: 2024-06-01 | End: 2024-06-01 | Stop reason: HOSPADM

## 2024-06-01 RX ORDER — NAPROXEN SODIUM 220 MG/1
TABLET, FILM COATED ORAL
Status: COMPLETED
Start: 2024-06-01 | End: 2024-06-01

## 2024-06-01 RX ORDER — ASCORBIC ACID 500 MG
500 TABLET ORAL DAILY
COMMUNITY

## 2024-06-01 RX ORDER — ACETAMINOPHEN 650 MG/1
650 SUPPOSITORY RECTAL EVERY 4 HOURS PRN
Status: DISCONTINUED | OUTPATIENT
Start: 2024-06-01 | End: 2024-06-01 | Stop reason: HOSPADM

## 2024-06-01 RX ORDER — ASPIRIN 81 MG/1
81 TABLET ORAL DAILY
Status: DISCONTINUED | OUTPATIENT
Start: 2024-06-01 | End: 2024-06-01 | Stop reason: HOSPADM

## 2024-06-01 RX ORDER — CHOLECALCIFEROL (VITAMIN D3) 50 MCG
50 TABLET ORAL DAILY
COMMUNITY

## 2024-06-01 RX ORDER — TALC
3 POWDER (GRAM) TOPICAL NIGHTLY
Status: DISCONTINUED | OUTPATIENT
Start: 2024-06-01 | End: 2024-06-01 | Stop reason: HOSPADM

## 2024-06-01 RX ORDER — ACETAMINOPHEN 160 MG/5ML
650 SOLUTION ORAL EVERY 4 HOURS PRN
Status: DISCONTINUED | OUTPATIENT
Start: 2024-06-01 | End: 2024-06-01 | Stop reason: HOSPADM

## 2024-06-01 RX ADMIN — SODIUM CHLORIDE, SODIUM LACTATE, POTASSIUM CHLORIDE, AND CALCIUM CHLORIDE 500 ML: 600; 310; 30; 20 INJECTION, SOLUTION INTRAVENOUS at 00:30

## 2024-06-01 RX ADMIN — ASPIRIN 81 MG 324 MG: 81 TABLET ORAL at 00:22

## 2024-06-01 RX ADMIN — NAPROXEN SODIUM 324 MG: 220 TABLET, FILM COATED ORAL at 00:22

## 2024-06-01 RX ADMIN — APIXABAN 2.5 MG: 2.5 TABLET, FILM COATED ORAL at 17:24

## 2024-06-01 SDOH — SOCIAL STABILITY: SOCIAL INSECURITY: ARE YOU OR HAVE YOU BEEN THREATENED OR ABUSED PHYSICALLY, EMOTIONALLY, OR SEXUALLY BY ANYONE?: NO

## 2024-06-01 SDOH — HEALTH STABILITY: MENTAL HEALTH
STRESS IS WHEN SOMEONE FEELS TENSE, NERVOUS, ANXIOUS, OR CAN'T SLEEP AT NIGHT BECAUSE THEIR MIND IS TROUBLED. HOW STRESSED ARE YOU?: NOT AT ALL

## 2024-06-01 SDOH — SOCIAL STABILITY: SOCIAL INSECURITY
WITHIN THE LAST YEAR, HAVE YOU BEEN HUMILIATED OR EMOTIONALLY ABUSED IN OTHER WAYS BY YOUR PARTNER OR EX-PARTNER?: PATIENT DECLINED

## 2024-06-01 SDOH — SOCIAL STABILITY: SOCIAL INSECURITY: ABUSE: ADULT

## 2024-06-01 SDOH — ECONOMIC STABILITY: HOUSING INSECURITY
IN THE LAST 12 MONTHS, WAS THERE A TIME WHEN YOU DID NOT HAVE A STEADY PLACE TO SLEEP OR SLEPT IN A SHELTER (INCLUDING NOW)?: NO

## 2024-06-01 SDOH — SOCIAL STABILITY: SOCIAL NETWORK: ARE YOU MARRIED, WIDOWED, DIVORCED, SEPARATED, NEVER MARRIED, OR LIVING WITH A PARTNER?: MARRIED

## 2024-06-01 SDOH — ECONOMIC STABILITY: TRANSPORTATION INSECURITY
IN THE PAST 12 MONTHS, HAS THE LACK OF TRANSPORTATION KEPT YOU FROM MEDICAL APPOINTMENTS OR FROM GETTING MEDICATIONS?: NO

## 2024-06-01 SDOH — SOCIAL STABILITY: SOCIAL INSECURITY: DO YOU FEEL ANYONE HAS EXPLOITED OR TAKEN ADVANTAGE OF YOU FINANCIALLY OR OF YOUR PERSONAL PROPERTY?: NO

## 2024-06-01 SDOH — ECONOMIC STABILITY: INCOME INSECURITY: HOW HARD IS IT FOR YOU TO PAY FOR THE VERY BASICS LIKE FOOD, HOUSING, MEDICAL CARE, AND HEATING?: NOT VERY HARD

## 2024-06-01 SDOH — SOCIAL STABILITY: SOCIAL NETWORK: HOW OFTEN DO YOU GET TOGETHER WITH FRIENDS OR RELATIVES?: MORE THAN THREE TIMES A WEEK

## 2024-06-01 SDOH — HEALTH STABILITY: PHYSICAL HEALTH: ON AVERAGE, HOW MANY MINUTES DO YOU ENGAGE IN EXERCISE AT THIS LEVEL?: 0 MIN

## 2024-06-01 SDOH — SOCIAL STABILITY: SOCIAL NETWORK: HOW OFTEN DO YOU ATTENT MEETINGS OF THE CLUB OR ORGANIZATION YOU BELONG TO?: 1 TO 4 TIMES PER YEAR

## 2024-06-01 SDOH — SOCIAL STABILITY: SOCIAL NETWORK
IN A TYPICAL WEEK, HOW MANY TIMES DO YOU TALK ON THE PHONE WITH FAMILY, FRIENDS, OR NEIGHBORS?: MORE THAN THREE TIMES A WEEK

## 2024-06-01 SDOH — SOCIAL STABILITY: SOCIAL NETWORK
DO YOU BELONG TO ANY CLUBS OR ORGANIZATIONS SUCH AS CHURCH GROUPS UNIONS, FRATERNAL OR ATHLETIC GROUPS, OR SCHOOL GROUPS?: NO

## 2024-06-01 SDOH — SOCIAL STABILITY: SOCIAL INSECURITY: ARE THERE ANY APPARENT SIGNS OF INJURIES/BEHAVIORS THAT COULD BE RELATED TO ABUSE/NEGLECT?: NO

## 2024-06-01 SDOH — HEALTH STABILITY: MENTAL HEALTH: HOW OFTEN DO YOU HAVE A DRINK CONTAINING ALCOHOL?: NEVER

## 2024-06-01 SDOH — ECONOMIC STABILITY: TRANSPORTATION INSECURITY
IN THE PAST 12 MONTHS, HAS LACK OF TRANSPORTATION KEPT YOU FROM MEETINGS, WORK, OR FROM GETTING THINGS NEEDED FOR DAILY LIVING?: NO

## 2024-06-01 SDOH — ECONOMIC STABILITY: INCOME INSECURITY: IN THE LAST 12 MONTHS, WAS THERE A TIME WHEN YOU WERE NOT ABLE TO PAY THE MORTGAGE OR RENT ON TIME?: NO

## 2024-06-01 SDOH — SOCIAL STABILITY: SOCIAL INSECURITY
WITHIN THE LAST YEAR, HAVE YOU BEEN KICKED, HIT, SLAPPED, OR OTHERWISE PHYSICALLY HURT BY YOUR PARTNER OR EX-PARTNER?: PATIENT DECLINED

## 2024-06-01 SDOH — ECONOMIC STABILITY: INCOME INSECURITY: IN THE PAST 12 MONTHS, HAS THE ELECTRIC, GAS, OIL, OR WATER COMPANY THREATENED TO SHUT OFF SERVICE IN YOUR HOME?: NO

## 2024-06-01 SDOH — SOCIAL STABILITY: SOCIAL INSECURITY: HAS ANYONE EVER THREATENED TO HURT YOUR FAMILY OR YOUR PETS?: NO

## 2024-06-01 SDOH — SOCIAL STABILITY: SOCIAL INSECURITY: HAVE YOU HAD THOUGHTS OF HARMING ANYONE ELSE?: NO

## 2024-06-01 SDOH — HEALTH STABILITY: MENTAL HEALTH
HOW OFTEN DO YOU NEED TO HAVE SOMEONE HELP YOU WHEN YOU READ INSTRUCTIONS, PAMPHLETS, OR OTHER WRITTEN MATERIAL FROM YOUR DOCTOR OR PHARMACY?: NEVER

## 2024-06-01 SDOH — SOCIAL STABILITY: SOCIAL INSECURITY: DOES ANYONE TRY TO KEEP YOU FROM HAVING/CONTACTING OTHER FRIENDS OR DOING THINGS OUTSIDE YOUR HOME?: NO

## 2024-06-01 SDOH — SOCIAL STABILITY: SOCIAL INSECURITY: HAVE YOU HAD ANY THOUGHTS OF HARMING ANYONE ELSE?: NO

## 2024-06-01 SDOH — SOCIAL STABILITY: SOCIAL NETWORK: HOW OFTEN DO YOU ATTEND CHURCH OR RELIGIOUS SERVICES?: 1 TO 4 TIMES PER YEAR

## 2024-06-01 SDOH — SOCIAL STABILITY: SOCIAL INSECURITY: WERE YOU ABLE TO COMPLETE ALL THE BEHAVIORAL HEALTH SCREENINGS?: YES

## 2024-06-01 SDOH — SOCIAL STABILITY: SOCIAL INSECURITY
WITHIN THE LAST YEAR, HAVE TO BEEN RAPED OR FORCED TO HAVE ANY KIND OF SEXUAL ACTIVITY BY YOUR PARTNER OR EX-PARTNER?: PATIENT DECLINED

## 2024-06-01 SDOH — SOCIAL STABILITY: SOCIAL INSECURITY: DO YOU FEEL UNSAFE GOING BACK TO THE PLACE WHERE YOU ARE LIVING?: NO

## 2024-06-01 SDOH — SOCIAL STABILITY: SOCIAL INSECURITY: WITHIN THE LAST YEAR, HAVE YOU BEEN AFRAID OF YOUR PARTNER OR EX-PARTNER?: PATIENT DECLINED

## 2024-06-01 SDOH — ECONOMIC STABILITY: HOUSING INSECURITY: IN THE LAST 12 MONTHS, HOW MANY PLACES HAVE YOU LIVED?: 1

## 2024-06-01 ASSESSMENT — COGNITIVE AND FUNCTIONAL STATUS - GENERAL
MOBILITY SCORE: 24
HELP NEEDED FOR BATHING: A LITTLE
MOBILITY SCORE: 24
DAILY ACTIVITIY SCORE: 20
DRESSING REGULAR LOWER BODY CLOTHING: A LITTLE
DAILY ACTIVITIY SCORE: 24
DAILY ACTIVITIY SCORE: 24
PATIENT BASELINE BEDBOUND: NO
DAILY ACTIVITIY SCORE: 24
DRESSING REGULAR UPPER BODY CLOTHING: A LITTLE
MOBILITY SCORE: 24
TOILETING: A LITTLE

## 2024-06-01 ASSESSMENT — ACTIVITIES OF DAILY LIVING (ADL)
ADEQUATE_TO_COMPLETE_ADL: YES
BATHING: INDEPENDENT
HEARING - RIGHT EAR: HEARING AID
FEEDING YOURSELF: INDEPENDENT
JUDGMENT_ADEQUATE_SAFELY_COMPLETE_DAILY_ACTIVITIES: YES
HEARING - LEFT EAR: HEARING AID
TOILETING: INDEPENDENT
DRESSING YOURSELF: INDEPENDENT
PATIENT'S MEMORY ADEQUATE TO SAFELY COMPLETE DAILY ACTIVITIES?: YES
WALKS IN HOME: INDEPENDENT
GROOMING: INDEPENDENT

## 2024-06-01 ASSESSMENT — PAIN - FUNCTIONAL ASSESSMENT
PAIN_FUNCTIONAL_ASSESSMENT: 0-10
PAIN_FUNCTIONAL_ASSESSMENT: 0-10

## 2024-06-01 ASSESSMENT — PAIN SCALES - GENERAL
PAINLEVEL_OUTOF10: 0 - NO PAIN
PAINLEVEL_OUTOF10: 1
PAINLEVEL_OUTOF10: 0 - NO PAIN

## 2024-06-01 ASSESSMENT — ENCOUNTER SYMPTOMS
CONFUSION: 1
CARDIOVASCULAR NEGATIVE: 1
CONSTITUTIONAL NEGATIVE: 1
ALLERGIC/IMMUNOLOGIC NEGATIVE: 1
DECREASED CONCENTRATION: 1
JOINT SWELLING: 1
RESPIRATORY NEGATIVE: 1
BRUISES/BLEEDS EASILY: 1
NEUROLOGICAL NEGATIVE: 1
EYES NEGATIVE: 1

## 2024-06-01 ASSESSMENT — LIFESTYLE VARIABLES
AUDIT-C TOTAL SCORE: 0
HOW MANY STANDARD DRINKS CONTAINING ALCOHOL DO YOU HAVE ON A TYPICAL DAY: PATIENT DOES NOT DRINK
SKIP TO QUESTIONS 9-10: 1
HOW OFTEN DO YOU HAVE A DRINK CONTAINING ALCOHOL: NEVER
PRESCIPTION_ABUSE_PAST_12_MONTHS: NO
HOW OFTEN DO YOU HAVE 6 OR MORE DRINKS ON ONE OCCASION: NEVER
SUBSTANCE_ABUSE_PAST_12_MONTHS: NO
AUDIT-C TOTAL SCORE: 0

## 2024-06-01 ASSESSMENT — PATIENT HEALTH QUESTIONNAIRE - PHQ9
1. LITTLE INTEREST OR PLEASURE IN DOING THINGS: NOT AT ALL
SUM OF ALL RESPONSES TO PHQ9 QUESTIONS 1 & 2: 0
2. FEELING DOWN, DEPRESSED OR HOPELESS: NOT AT ALL

## 2024-06-01 NOTE — PROGRESS NOTES
Occupational Therapy    Evaluation    Patient Name: Sherri Grace  MRN: [id number]  Today's Date: 6/1/2024  Time Calculation  Start Time: 1435  Stop Time: 1447  Time Calculation (min): 12 min  3121/3121-A  Eval only     Assessment  IP OT Assessment  Prognosis: Good  End of Session Communication: PCT/NA/CTA  End of Session Patient Position: Up in chair, Alarm on  Patient presents with decline in ADLs, functional transfers, functional mobility and would benefit from OT during acute stay to improve functional independence and safety. Recommend low intensity OT to maximize functional independence and safety.     Plan:  Treatment Interventions: ADL retraining, Functional transfer training, Patient/family training, Equipment evaluation/education, Compensatory technique education  OT Frequency: 5 times per week  OT Discharge Recommendations: Low intensity level of continued care  OT - OK to Discharge: Yes from acute care OT services to the next level of care when cleared by medical team      Subjective   Current Problem:  1. Accidental fall, sequela            General:  General  Reason for Referral: ADLs  Referred By: Ruddy Sky DO  Past Medical History Relevant to Rehab: per EMR: 89yo man with dementia, CAD s/p PCI, Afib on apixaban admitted with troponin elevation and R wrist fracture following a fall.      Patient reports that he was moving a garbage can earlier this evening when he got his foot caught in a hole on the ground.  He stumbled and fell to the ground on an outstretched right hand.  He noted subsequent right wrist swelling and pain, and thus presented to the ER.  He denied any lightheadedness, chest pain, shortness of breath prior to the fall.  Denied LOC before or after the fall.  Denies any recent medication changes or changes to p.o. intake.  Denies any recent chest pain, shortness of breath with exertion, anginal symptoms.  Denies fever/chills, nausea/vomiting/diarrhea.     On arrival to ED, patient  afebrile, HR 70s, normotensive, satting well on RA. Labs with mild hyponatremia 135, mild bump in baseline sCr to 1.42 from 1.24, no leukocytosis, chronic normocytic anemia with Hgb 10.6. Troponin drawn with mild elevation 58 -> 65. EKG with Afib with no acute ST changes. CTH with no traumatic injury. Wrist CR with comminuted fracture through distal radius with mild displacement and mild impaction, soft tissue edema, remote appearing fracture through head of 5th MCP. ED MD attempted reduction of patient's radial fracture with subsequent splinting. Patient admitted due to troponin elevation.      PMhx: CKDIII, CAD s/p PCI, DLD, Afib, dementia, GERD, HTN, BPH, DEIRDRE, macular degeneration, cholelithiasis, actinic keratosis  Missed Visit: Yes  Co-Treatment: PT  Co-Treatment Reason: for safety  Prior to Session Communication: PCT/NA/CTA  Patient Position Received: Up in chair, Alarm on  Preferred Learning Style: verbal  General Comment: Patient seated in bedside chair and agreeable to paritcipate in OT evaluation.    Precautions:  UE Weight Bearing Status: Right Non-Weight Bearing  Medical Precautions: Fall precautions    Pain:  Pain Assessment  Pain Assessment: 0-10  Pain Score: 1  Pain Type: Acute pain  Pain Location: Arm  Pain Orientation: Right  Pain Interventions: Rest    Objective   Cognition:  Overall Cognitive Status: Within Functional Limits    Home Living:  Home Living Comments: Lives in 2 story home with spouse with 2 LD. Has full flight of stairs to 2nd floor with right HR.  Has WIS with seat and GB. Does have a bathroom on first floor but prefers 2nd floor bathroom.     Prior Function:  Prior Function Comments: Was independent with all ADLs and functional mobility tasks without AD    ADL:  UE Dressing Assistance: Minimal (anticipated)  LE Dressing Assistance: Minimal (anticipated)    Activity Tolerance:  Endurance: Endurance does not limit participation in activity    Bed Mobility/Transfers:       Transfers  Transfer:  (supervision sit <>stand)    Ambulation/Gait Training:  Functional Mobility  Functional Mobility Performed:  (SBA without AD functional mobility task)    Extremities: RUE   RUE :  (not assessed secondary to patient in splint) and LUE   LUE: Within Functional Limits    Outcome Measures: [facility] Daily Activity  Putting on and taking off regular lower body clothing: A little  Bathing (including washing, rinsing, drying): A little  Putting on and taking off regular upper body clothing: A little  Toileting, which includes using toilet, bedpan or urinal: A little  Taking care of personal grooming such as brushing teeth: None  Eating Meals: None  Daily Activity - Total Score: 20    EDUCATION:  Education  Individual(s) Educated: Patient  Education Provided: Fall precautons (NWB right UE, comp strategies with UB dressing)  Patient Response to Education: Patient/Caregiver Verbalized Understanding of Information    Goals:   Encounter Problems       Encounter Problems (Active)       Dressing Upper Extremities       STG - Patient will dress upper body with SBA with comp strategies (Progressing)       Start:  06/01/24    Expected End:  06/15/24               Dressings Lower Extremities       STG - Patient will complete lower body dressing with SBA (Progressing)       Start:  06/01/24    Expected End:  06/15/24               Functional Balance       STG-Patient will be independent with dynamic stand task >5 minutes for ADL completion   (Progressing)       Start:  06/01/24    Expected End:  06/15/24               Functional Mobility       STG-Patient will be independent with functional mobility tasks   (Progressing)       Start:  06/01/24    Expected End:  06/15/24               OT Transfers       STG-Patient will be independent with functional transfers demonstrating good safety   (Progressing)       Start:  06/01/24    Expected End:  06/15/24

## 2024-06-01 NOTE — ED PROVIDER NOTES
HPI   Chief Complaint   Patient presents with    Wrist Injury     Pt fell, mechanical, today injuring wrist on right side.  Denies any LOC or head injury.  There is a small abrasion on outside of wrist and wrist is swollen.  Denies any dizziness or light headedness       HPI     Patient is a 89 year old male with a past medical history of afibb on eliquis and dementia who presents to the ED with a chief complaint of wrist pain following an unwitnessed fall. Patient has dementia so had a very limited memory about what happened prior to the fall. He states that he was walking to move a garbage can when he believes he lost balance and fell down into the grass. He states that he attempted to catch his fall with his right wrist. He reports that since the incident he has been experiencing pain and swelling in his wrist. He does not think he hit his head at the time of the incident. He did not lose consciousness. He is on blood thinners. He denies any headaches, vision changes, chest pain, shortness of breath, numbness, tingling or weakness.                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    Other conditions influencing health status 05/10/2019    History of cough    Personal history of other diseases of the circulatory system     History of cardiac arrhythmia    Personal history of other diseases of the circulatory system     History of hypertension    Personal history of other diseases of the circulatory system 12/28/2021    History of essential hypertension    Personal history of other diseases of the musculoskeletal system and connective tissue 12/28/2021    History of arthritis    Personal history of other diseases of the nervous system and sense organs 12/28/2021    History of eye problem    Snoring     Snoring    Vasovagal near syncope 11/07/2013    Weight loss 02/08/2023     Past Surgical History:   Procedure  Laterality Date    HERNIA REPAIR  07/02/2015    Hernia Repair    OTHER SURGICAL HISTORY  06/02/2015    Previous Stent Placement    OTHER SURGICAL HISTORY  04/19/2019    Colonoscopy     Family History   Problem Relation Name Age of Onset    Other (Cerebrovascular accident) Mother      Hypertension Mother      Hypertension Father      Heart attack Father      Other (MALIGNANT NEOPLASM) Other       Social History     Tobacco Use    Smoking status: Never    Smokeless tobacco: Never   Vaping Use    Vaping status: Never Used   Substance Use Topics    Alcohol use: Not Currently    Drug use: Never       Physical Exam   ED Triage Vitals [05/31/24 2020]   Temperature Heart Rate Respirations BP   36.2 °C (97.2 °F) 75 18 126/69      Pulse Ox Temp Source Heart Rate Source Patient Position   99 % Temporal Monitor Sitting      BP Location FiO2 (%)     Left arm --       Physical Exam  Constitutional:       General: He is not in acute distress.  HENT:      Head: Normocephalic and atraumatic.      Right Ear: Tympanic membrane, ear canal and external ear normal.      Left Ear: Tympanic membrane, ear canal and external ear normal.      Mouth/Throat:      Mouth: Mucous membranes are moist.   Eyes:      General:         Right eye: No discharge.         Left eye: No discharge.      Extraocular Movements: Extraocular movements intact.      Conjunctiva/sclera: Conjunctivae normal.      Pupils: Pupils are equal, round, and reactive to light.   Cardiovascular:      Rate and Rhythm: Normal rate and regular rhythm.      Pulses: Normal pulses.      Heart sounds: Normal heart sounds.   Pulmonary:      Effort: Pulmonary effort is normal.      Breath sounds: Normal breath sounds.   Musculoskeletal:         General: Tenderness, deformity and signs of injury present.      Cervical back: Neck supple. No tenderness.      Comments: Erythema, tenderness and swelling noted in the right wrist. Wrist deformity noted. Radial pulse 1+ in the right hand as  opposed to 2+ in the left hand.    Skin:     General: Skin is warm and dry.      Capillary Refill: Capillary refill takes less than 2 seconds.   Neurological:      General: No focal deficit present.      Mental Status: He is alert. Mental status is at baseline.      Cranial Nerves: No cranial nerve deficit.      Sensory: No sensory deficit.      Motor: No weakness.         ED Course & MDM   ED Course as of 06/01/24 0134   Fri May 31, 2024   2344 Troponin I, High Sensitivity(!!): 65 [CR]      ED Course User Index  [CR] Asher Kumari MD         Diagnoses as of 06/01/24 0134   Accidental fall, sequela       Medical Decision Making  Patient is a 89 year old male with a past medical history of afibb on eliquis and dementia who presents to the ED with a chief complaint of wrist pain following an unwitnessed fall.  Upon arrival to the ED, patient was hemodynamically stable. Given his complaints, we did proceed with imaging of the right wrist. A cardiac workup and CTH was also added as the patient has a history of dementia and has a very limited memory about what occurred prior to the fall.     On blood work, he was not noted to have any leukocytosis.  H/H showed anemia at 10.6 and 34.0.  On chemistries, he was noted to have slight hyponatremia at 135, and DALJIT with creatinine at 1.42 with a low GFR of 45, and an elevated AST at 57.  Magnesium was within normal limits at 2.34.  Troponins were elevated at 58 and 65.  EKG showed atrial fibrillation with a ventricular rate of 70.  CT head without IV contrast did not show any evidence of acute cortical infarcts or intracranial hemorrhages.  X-ray of the right wrist showed a comminuted fracture to the distal radius with mild displacement and mild impaction as well as soft tissue edema.  There was also a remote appearing fracture to the head of the fifth metacarpal.    A wrist reduction was performed without any immediate complications.  Given his elevated troponin in the setting  of an abnormal EKG, we felt was appropriate to admit the patient to the medicine team (Dr. Rick) for continued monitoring. Medicine was contacted and agreed with this plan. He was given aspirin while in the ED prior to transfer.     Procedure  Procedures     Jacey Givens MD  Resident  05/31/24 0291       Jacey Givens MD  Resident  06/01/24 [id number]

## 2024-06-01 NOTE — ED PROCEDURE NOTE
Procedure  Nerve Block    Performed by: Asher Kumari MD  Authorized by: Asher Kumari MD        Verbal consent obtained from patient and son at bedside  Risk benefits discussed  Shared decision making used to attempt hematoma block, finger traps, manual closed reduction attempt without sedation due to age and trop and activity level  Skin prepped with chlorhexidine  Using 10 mL of 0.25% bupivacaine and 2 mL of 2% lidocaine used 22-gauge 1.5 inch needle to approach fracture site, blood obtained from hematoma site and anesthetize area  Pt controlled procedure well  Patient placed in finger traps     Asher Kumari MD  05/31/24 [id number]

## 2024-06-01 NOTE — H&P
History Of Present Illness  Mr Grace is a 89yo man with dementia, CAD s/p PCI, Afib on apixaban admitted with troponin elevation and R wrist fracture following a fall.     Patient reports that he was moving a garbage can earlier this evening when he got his foot caught in a hole on the ground.  He stumbled and fell to the ground on an outstretched right hand.  He noted subsequent right wrist swelling and pain, and thus presented to the ER.  He denied any lightheadedness, chest pain, shortness of breath prior to the fall.  Denied LOC before or after the fall.  Denies any recent medication changes or changes to p.o. intake.  Denies any recent chest pain, shortness of breath with exertion, anginal symptoms.  Denies fever/chills, nausea/vomiting/diarrhea.    On arrival to ED, patient afebrile, HR 70s, normotensive, satting well on RA. Labs with mild hyponatremia 135, mild bump in baseline sCr to 1.42 from 1.24, no leukocytosis, chronic normocytic anemia with Hgb 10.6. Troponin drawn with mild elevation 58 -> 65. EKG with Afib with no acute ST changes. CTH with no traumatic injury. Wrist CR with comminuted fracture through distal radius with mild displacement and mild impaction, soft tissue edema, remote appearing fracture through head of 5th MCP. ED MD attempted reduction of patient's radial fracture with subsequent splinting. Patient admitted due to troponin elevation.     PMhx: CKDIII, CAD s/p PCI, DLD, Afib, dementia, GERD, HTN, BPH, DEIRDRE, macular degeneration, cholelithiasis, actinic keratosis    Meds: med rec to be done. Per chart review appears to be on apixaban and ASA    All: atorvastatin, omeprazole, esomeprazole    FamHx: reviewed and not pertinent to presenting complaint    SocHx: No EtOH, recreational drug, tobacco use.     ROS: 12 pt ROS reviewed and is negative except as noted in HPI        Last Recorded Vitals  /72 (BP Location: Left arm, Patient Position: Lying)   Pulse 72   Temp 36.9 °C (98.4 °F)  (Tympanic)   Resp 16   Wt 56.8 kg (125 lb 3.5 oz)   SpO2 100%     Physical exam:  Gen: well appearing, no acute distress, appears younger than stated age  HEENT: MMM, EOMI, no scleral icterus  Neck: supple, no LAD  CV: irregularly irregular, no murmurs appreciated  Lungs: good air entry b/l, no rhonchi/wheezes appreciated  Abd: soft, nontender, nondistended, normoactive BS  Ext: WWP, no edema, RUE in splint  Skin: no rashes/lesions  Neuro: Cns III-XII grossly intact, alert and oriented to self, age, date, and place  Psych: appropriate mood and affect       Relevant Results  Scheduled medications  lactated Ringer's, 500 mL, intravenous, Once      Continuous medications     PRN medications    Results for orders placed or performed during the hospital encounter of 05/31/24 (from the past 24 hour(s))   CBC and Auto Differential   Result Value Ref Range    WBC 9.4 4.4 - 11.3 x10*3/uL    nRBC 0.0 0.0 - 0.0 /100 WBCs    RBC 3.65 (L) 4.50 - 5.90 x10*6/uL    Hemoglobin 10.6 (L) 13.5 - 17.5 g/dL    Hematocrit 34.0 (L) 41.0 - 52.0 %    MCV 93 80 - 100 fL    MCH 29.0 26.0 - 34.0 pg    MCHC 31.2 (L) 32.0 - 36.0 g/dL    RDW 15.8 (H) 11.5 - 14.5 %    Platelets 332 150 - 450 x10*3/uL    Neutrophils % 71.2 40.0 - 80.0 %    Immature Granulocytes %, Automated 0.2 0.0 - 0.9 %    Lymphocytes % 15.6 13.0 - 44.0 %    Monocytes % 11.4 2.0 - 10.0 %    Eosinophils % 1.2 0.0 - 6.0 %    Basophils % 0.4 0.0 - 2.0 %    Neutrophils Absolute 6.67 (H) 1.60 - 5.50 x10*3/uL    Immature Granulocytes Absolute, Automated 0.02 0.00 - 0.50 x10*3/uL    Lymphocytes Absolute 1.46 0.80 - 3.00 x10*3/uL    Monocytes Absolute 1.07 (H) 0.05 - 0.80 x10*3/uL    Eosinophils Absolute 0.11 0.00 - 0.40 x10*3/uL    Basophils Absolute 0.04 0.00 - 0.10 x10*3/uL   Comprehensive Metabolic Panel   Result Value Ref Range    Glucose 91 74 - 99 mg/dL    Sodium 135 (L) 136 - 145 mmol/L    Potassium 5.3 3.5 - 5.3 mmol/L    Chloride 101 98 - 107 mmol/L    Bicarbonate 26 21 - 32  mmol/L    Anion Gap 13 10 - 20 mmol/L    Urea Nitrogen 30 (H) 6 - 23 mg/dL    Creatinine 1.42 (H) 0.50 - 1.30 mg/dL    eGFR 45 (L) >60 mL/min/1.73m*2    Calcium 8.9 8.6 - 10.3 mg/dL    Albumin 3.9 3.4 - 5.0 g/dL    Alkaline Phosphatase 70 33 - 136 U/L    Total Protein 8.3 (H) 6.4 - 8.2 g/dL    AST 57 (H) 9 - 39 U/L    Bilirubin, Total 0.6 0.0 - 1.2 mg/dL    ALT 23 10 - 52 U/L   Magnesium   Result Value Ref Range    Magnesium 2.34 1.60 - 2.40 mg/dL   Troponin I, High Sensitivity   Result Value Ref Range    Troponin I, High Sensitivity 58 (HH) 0 - 20 ng/L   Light Blue Top   Result Value Ref Range    Extra Tube Hold for add-ons.    Troponin I, High Sensitivity   Result Value Ref Range    Troponin I, High Sensitivity 65 (HH) 0 - 20 ng/L       CT head wo IV contrast    Result Date: 5/31/2024  Interpreted By:  Everette Whitten, STUDY: CT HEAD WO IV CONTRAST;  5/31/2024 9:29 pm   INDICATION: Signs/Symptoms:fall.   COMPARISON: 02/09/2024   ACCESSION NUMBER(S): [id number]   ORDERING CLINICIAN: SKY MCKINNEY   TECHNIQUE: Noncontrast axial CT scan of head was performed. Angled reformats in brain and bone windows were generated. The images were reviewed in bone, brain, blood and soft tissue windows.   FINDINGS: CSF Spaces: The ventricles, sulci and basal cisterns are within normal limits. There is no extraaxial fluid collection. Global volume loss and chronic small vessel ischemic change   Parenchyma:  The grey-white differentiation is intact. There is no mass effect or midline shift.  There is no intracranial hemorrhage.   Calvarium: The calvarium is unremarkable.   Paranasal sinuses and mastoids: Visualized paranasal sinuses and mastoids are clear. Robust vascular calcification. Demineralization of the bones.       No evidence of acute cortical infarct or intracranial hemorrhage.   Senescent changes   MACRO: None   Signed by: Everette Whitten 5/31/2024 10:07 PM Dictation workstation:   [id number]    XR wrist right 3+  views    Result Date: 5/31/2024  Interpreted By:  Justin Martinez, STUDY: XR WRIST RIGHT 3+ VIEWS; ;  5/31/2024 8:48 pm   INDICATION: Signs/Symptoms:injury.   COMPARISON: None.   ACCESSION NUMBER(S): [id number]   ORDERING CLINICIAN: SKY MCKINNEY   FINDINGS: Right wrist, four views   There is a comminuted, mildly impacted and mildly displaced fracture through the distal radius with intra-articular extension. No other acute fracture seen. There is a remote fracture through the head of the 5th metacarpal. There is severe osteopenia. There is no dislocation.   There is moderate severe soft tissue edema about the wrist. There is severe 1st CMC and 1st MCP joint space narrowing with osteophyte formation.       Comminuted fracture through the distal radius with mild displacement and mild impaction. Soft tissue edema. Remote appearing fracture through the head of 5th metacarpal. Please correlate point tenderness.   MACRO: None   Signed by: Justin Martinez 5/31/2024 9:16 PM Dictation workstation:   [id number]        Assessment/Plan   Mr Grace is a 89yo man with dementia, CAD s/p PCI, Afib on apixaban admitted with troponin elevation and R wrist fracture following a fall.      #R wrist fracture  #mechanical fall  -fall appears purely mechanical - no prodromal sx  -s/p reduction in ER, splint in place  -follow up XR after reduction obtained - per read, no change in alignment. As such, orthopedic surgery consulted.   -PT/OT evals pending    #CAD s/p PCI  #troponin elevation  #Afib   -suspect troponin elevation is type II MI in the setting of fall, ?dehydration (mild sCr bump), and underlying CAD. Patient does not have any cardiac sx, and no EKG changes suggestive of ACS  -resume home ASA  -hold apixaban in light of above    #sCr bump  -sCr 1.4s from 1.2s  -500 ml bolus    Misc: resume home meds once med rec complete    FEN/GI: NPO pending orthopedic surgery eval  Access: PIV  Ppx: on AC  Code: full - confirmed on admission      Patient intends to return home - daughter stays with him and his wife.     Yesenia Rick MD

## 2024-06-01 NOTE — CONSULTS
Consults    Reason For Consult  Right wrist fracture    History Of Present Illness  Sherri Grace is a 89 y.o. male with a past medical history of atrial fibrillation on Eliquis, dementia, hypertension, CAD with history of cardiac stent, CKD and anemia, presenting to the emergency room after a fall.  Patient states he was taking out the garbage tripped and fell with his right arm outstretched to brace himself.  He suffered no loss of consciousness before or after the fall, chest pain, and he denies hitting his head.  He developed pain and swelling in the right wrist area that continued to become increasingly more painful.  No complaints of chest pain, shortness of breath, lightheaded or dizziness.  Right forearm was placed in a splint in ER.  States currently he is having little to no pain with no change in sensation in fingers of right hand.     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    Surgical History  He has a past surgical history that includes Other surgical history (06/02/2015); Hernia repair (07/02/2015); and Other surgical history (04/19/2019).     Social History  He reports that he has never smoked. He has never used smokeless tobacco. He reports that he does not currently use alcohol. He reports that he does not use drugs.    Family History  Family History   Problem Relation Name Age of Onset    Other (Cerebrovascular accident) Mother       "Hypertension Mother      Hypertension Father      Heart attack Father      Other (MALIGNANT NEOPLASM) Other          Allergies  Atorvastatin, Esomeprazole magnesium, and Omeprazole    Review of Systems   Constitutional: Negative.    HENT: Negative.     Eyes: Negative.    Respiratory: Negative.     Cardiovascular: Negative.    Endocrine: Positive for cold intolerance.   Genitourinary: Negative.    Musculoskeletal:  Positive for joint swelling.   Skin: Negative.    Allergic/Immunologic: Negative.    Neurological: Negative.    Hematological:  Bruises/bleeds easily.   Psychiatric/Behavioral:  Positive for confusion and decreased concentration.         Physical Exam  Constitutional:       Appearance: Normal appearance.   HENT:      Head: Normocephalic and atraumatic.      Nose: Nose normal.      Mouth/Throat:      Mouth: Mucous membranes are moist.   Cardiovascular:      Rate and Rhythm: Normal rate.   Pulmonary:      Effort: Pulmonary effort is normal.   Abdominal:      General: Abdomen is flat.      Palpations: Abdomen is soft.   Musculoskeletal:      Cervical back: Neck supple.      Comments: Right forearm in the splint, right hand with swelling present, sensation present, fingers of right hand swollen with movement intact, brisk capillary refill, pink warm and dry   Skin:     General: Skin is warm and dry.   Neurological:      General: No focal deficit present.      Mental Status: He is alert.   Psychiatric:         Mood and Affect: Mood normal.          Last Recorded Vitals  Blood pressure 101/55, pulse 63, temperature 36.5 °C (97.7 °F), temperature source Temporal, resp. rate 16, height 1.549 m (5' 0.98\"), weight 56.8 kg (125 lb 3.5 oz), SpO2 96%.    Relevant Results    ECG 12 lead    Result Date: 6/1/2024  Atrial fibrillation ST abnormality, possible digitalis effect Abnormal ECG When compared with ECG of 31-MAY-2024 21:46, (unconfirmed) No significant change was found Confirmed by Vinod Rivera ([id number]) on 6/1/2024 " 12:21:42 PM    ECG 12 lead    Result Date: 6/1/2024  Atrial fibrillation Abnormal ECG No previous ECGs available Confirmed by Vinod Rivera ([id number]) on 6/1/2024 12:18:04 PM    XR wrist right 1-2 views    Result Date: 6/1/2024  Interpreted By:  Everette Whitten, STUDY: Right wrist,  2 views.   INDICATION: Signs/Symptoms:s/p reduction and splinting of distal radius.   COMPARISON: 05/31/2024.   ACCESSION NUMBER(S): [id number]   ORDERING CLINICIAN: SKY MCKINNEY   FINDINGS: Casted right distal radius fracture without change in alignment. Demineralization of the bones. Cast material obscures bony detail       1. Casted distal radius fracture on the right without change in alignment   MACRO: None.   Signed by: Everette Whitten 6/1/2024 1:26 AM Dictation workstation:   [id number]    CT head wo IV contrast    Result Date: 5/31/2024  Interpreted By:  Everette Whitten, STUDY: CT HEAD WO IV CONTRAST;  5/31/2024 9:29 pm   INDICATION: Signs/Symptoms:fall.   COMPARISON: 02/09/2024   ACCESSION NUMBER(S): [id number]   ORDERING CLINICIAN: SKY MCKINNEY   TECHNIQUE: Noncontrast axial CT scan of head was performed. Angled reformats in brain and bone windows were generated. The images were reviewed in bone, brain, blood and soft tissue windows.   FINDINGS: CSF Spaces: The ventricles, sulci and basal cisterns are within normal limits. There is no extraaxial fluid collection. Global volume loss and chronic small vessel ischemic change   Parenchyma:  The grey-white differentiation is intact. There is no mass effect or midline shift.  There is no intracranial hemorrhage.   Calvarium: The calvarium is unremarkable.   Paranasal sinuses and mastoids: Visualized paranasal sinuses and mastoids are clear. Robust vascular calcification. Demineralization of the bones.       No evidence of acute cortical infarct or intracranial hemorrhage.   Senescent changes   MACRO: None   Signed by: Everette Whitten 5/31/2024 10:07 PM Dictation workstation:    [id number]    XR wrist right 3+ views    Result Date: 5/31/2024  Interpreted By:  Justin Martinez, STUDY: XR WRIST RIGHT 3+ VIEWS; ;  5/31/2024 8:48 pm   INDICATION: Signs/Symptoms:injury.   COMPARISON: None.   ACCESSION NUMBER(S): [id number]   ORDERING CLINICIAN: SKY MCKINNEY   FINDINGS: Right wrist, four views   There is a comminuted, mildly impacted and mildly displaced fracture through the distal radius with intra-articular extension. No other acute fracture seen. There is a remote fracture through the head of the 5th metacarpal. There is severe osteopenia. There is no dislocation.   There is moderate severe soft tissue edema about the wrist. There is severe 1st CMC and 1st MCP joint space narrowing with osteophyte formation.       Comminuted fracture through the distal radius with mild displacement and mild impaction. Soft tissue edema. Remote appearing fracture through the head of 5th metacarpal. Please correlate point tenderness.   MACRO: None   Signed by: Justin Martinez 5/31/2024 9:16 PM Dictation workstation:   [id number]    Scheduled medications  [Held by provider] apixaban, 2.5 mg, oral, q12h  aspirin, 81 mg, oral, Daily  melatonin, 3 mg, oral, Nightly      Continuous medications     PRN medications  PRN medications: acetaminophen **OR** acetaminophen **OR** acetaminophen, polyethylene glycol  Results for orders placed or performed during the hospital encounter of 05/31/24 (from the past 24 hour(s))   CBC and Auto Differential   Result Value Ref Range    WBC 9.4 4.4 - 11.3 x10*3/uL    nRBC 0.0 0.0 - 0.0 /100 WBCs    RBC 3.65 (L) 4.50 - 5.90 x10*6/uL    Hemoglobin 10.6 (L) 13.5 - 17.5 g/dL    Hematocrit 34.0 (L) 41.0 - 52.0 %    MCV 93 80 - 100 fL    MCH 29.0 26.0 - 34.0 pg    MCHC 31.2 (L) 32.0 - 36.0 g/dL    RDW 15.8 (H) 11.5 - 14.5 %    Platelets 332 150 - 450 x10*3/uL    Neutrophils % 71.2 40.0 - 80.0 %    Immature Granulocytes %, Automated 0.2 0.0 - 0.9 %    Lymphocytes % 15.6 13.0 - 44.0 %     Monocytes % 11.4 2.0 - 10.0 %    Eosinophils % 1.2 0.0 - 6.0 %    Basophils % 0.4 0.0 - 2.0 %    Neutrophils Absolute 6.67 (H) 1.60 - 5.50 x10*3/uL    Immature Granulocytes Absolute, Automated 0.02 0.00 - 0.50 x10*3/uL    Lymphocytes Absolute 1.46 0.80 - 3.00 x10*3/uL    Monocytes Absolute 1.07 (H) 0.05 - 0.80 x10*3/uL    Eosinophils Absolute 0.11 0.00 - 0.40 x10*3/uL    Basophils Absolute 0.04 0.00 - 0.10 x10*3/uL   Comprehensive Metabolic Panel   Result Value Ref Range    Glucose 91 74 - 99 mg/dL    Sodium 135 (L) 136 - 145 mmol/L    Potassium 5.3 3.5 - 5.3 mmol/L    Chloride 101 98 - 107 mmol/L    Bicarbonate 26 21 - 32 mmol/L    Anion Gap 13 10 - 20 mmol/L    Urea Nitrogen 30 (H) 6 - 23 mg/dL    Creatinine 1.42 (H) 0.50 - 1.30 mg/dL    eGFR 45 (L) >60 mL/min/1.73m*2    Calcium 8.9 8.6 - 10.3 mg/dL    Albumin 3.9 3.4 - 5.0 g/dL    Alkaline Phosphatase 70 33 - 136 U/L    Total Protein 8.3 (H) 6.4 - 8.2 g/dL    AST 57 (H) 9 - 39 U/L    Bilirubin, Total 0.6 0.0 - 1.2 mg/dL    ALT 23 10 - 52 U/L   Magnesium   Result Value Ref Range    Magnesium 2.34 1.60 - 2.40 mg/dL   Troponin I, High Sensitivity   Result Value Ref Range    Troponin I, High Sensitivity 58 (HH) 0 - 20 ng/L   Light Blue Top   Result Value Ref Range    Extra Tube Hold for add-ons.    ECG 12 lead   Result Value Ref Range    Ventricular Rate 70 BPM    Atrial Rate 77 BPM    QRS Duration 78 ms    QT Interval 398 ms    QTC Calculation(Bazett) 429 ms    R Axis 56 degrees    T Axis 39 degrees    QRS Count 12 beats    Q Onset 221 ms    T Offset 420 ms    QTC Fredericia 419 ms   Troponin I, High Sensitivity   Result Value Ref Range    Troponin I, High Sensitivity 65 (HH) 0 - 20 ng/L   ECG 12 lead   Result Value Ref Range    Ventricular Rate 73 BPM    Atrial Rate 68 BPM    QRS Duration 80 ms    QT Interval 386 ms    QTC Calculation(Bazett) 425 ms    R Axis 68 degrees    T Axis 66 degrees    QRS Count 11 beats    Q Onset 218 ms    T Offset 411 ms    QTC  Pancho 412 ms   Troponin I, High Sensitivity   Result Value Ref Range    Troponin I, High Sensitivity 65 (HH) 0 - 20 ng/L   CBC   Result Value Ref Range    WBC 7.7 4.4 - 11.3 x10*3/uL    nRBC 0.0 0.0 - 0.0 /100 WBCs    RBC 3.27 (L) 4.50 - 5.90 x10*6/uL    Hemoglobin 9.4 (L) 13.5 - 17.5 g/dL    Hematocrit 31.0 (L) 41.0 - 52.0 %    MCV 95 80 - 100 fL    MCH 28.7 26.0 - 34.0 pg    MCHC 30.3 (L) 32.0 - 36.0 g/dL    RDW 15.6 (H) 11.5 - 14.5 %    Platelets 258 150 - 450 x10*3/uL   Basic metabolic panel   Result Value Ref Range    Glucose 93 74 - 99 mg/dL    Sodium 140 136 - 145 mmol/L    Potassium 3.7 3.5 - 5.3 mmol/L    Chloride 106 98 - 107 mmol/L    Bicarbonate 26 21 - 32 mmol/L    Anion Gap 12 10 - 20 mmol/L    Urea Nitrogen 35 (H) 6 - 23 mg/dL    Creatinine 1.40 (H) 0.50 - 1.30 mg/dL    eGFR 45 (L) >60 mL/min/1.73m*2    Calcium 8.5 (L) 8.6 - 10.3 mg/dL        Assessment/Plan     Right wrist fracture, comminuted mildly impacted and mildly displaced distal radius with intra-articular extension    No need for surgical intervention at this time, attempted reduction in emergency room was unsuccessful, wrist was splinted, and currently pain is controlled.  Continue ice and elevation.  Follow-up with Dr. Gunter in 1 week for further x-rays and possible casting.  Thank you for allowing us to participate in this patient's care, appreciate the consult, please call with any further questions or concerns.    Rose Marie Hill PA-C    Assessment/plan: Right distal radius fracture  1.  Recommend nonoperative care  2.  Patient nonweightbearing right upper extremity.  Patient is currently in a splint.  Plan see him in the office in a week and will do a wound check and transition to a cast.  Risk of not nonunion malunion and cast Related complications discussed with the patient.  See us for follow-up    Patient was seen and evaluated independently in a face-to-face encounter.  I performed a history and physical examination,  discussed pertinent diagnostic studies if indicated, and discussed diagnosis and management strategies with both the patient and the mid-level provider. We reviewed the history, exam and imaging associated with the patient encounter.  We have discussed with the patient the plan of care.  Patient was seen in conjunction with our orthopedic nurse practitioner/physicians assistant.  I agree with their findings, assessment and clinical plan of care.

## 2024-06-01 NOTE — DISCHARGE SUMMARY
Discharge Diagnosis  Fall    Issues Requiring Follow-Up  Repeat wrist XR in 1 week and follow up with orthopedics for possible casting  Continue with outpatient OT as directed  Use of ice and elevation for optimal symptom management    Discharge Meds     Your medication list        CONTINUE taking these medications        Instructions Last Dose Given Next Dose Due   apixaban 2.5 mg tablet  Commonly known as: Eliquis           ascorbic acid 500 mg tablet  Commonly known as: Vitamin C           ASPIRIN ORAL           cholecalciferol 50 MCG (2000 UT) tablet  Commonly known as: Vitamin D-3           cyanocobalamin 500 mcg tablet  Commonly known as: Vitamin B-12      Take 1 tablet (500 mcg) by mouth once daily.       famotidine 20 mg tablet  Commonly known as: Pepcid      Take 1 tablet (20 mg) by mouth 2 times a day.       ferrous sulfate 325 (65 Fe) MG EC tablet      Take 1 tablet by mouth every other day. Do not crush, chew, or split.       lisinopril 40 mg tablet      Lisinopril 40 MG Oral Tablet  Quantity: 90  Refills: 0    Start : 28-May-2021 Active       PRESERVISION AREDS-2 ORAL           tamsulosin 0.4 mg 24 hr capsule  Commonly known as: Flomax      TAKE 1 CAPSULE BY MOUTH 30 MINUTES FOLLOWING THE SAME MEAL EACH DAY       VITAMIN B COMPLEX ORAL                  ASK your doctor about these medications        Instructions Last Dose Given Next Dose Due   ipratropium 42 mcg (0.06 %) nasal spray  Commonly known as: Atrovent      2 sprays per nostril TID as needed and before eating.                Test Results Pending At Discharge  Pending Labs       No current pending labs.            Hospital Course  Mr Grace is a 89yo man with dementia, CAD s/p PCI, Afib on apixaban admitted with troponin elevation and R wrist fracture following a fall.      Patient reports that he was moving a garbage can earlier this evening when he got his foot caught in a hole on the ground.  He stumbled and fell to the ground on an  outstretched right hand.  He noted subsequent right wrist swelling and pain, and thus presented to the ER.  He denied any lightheadedness, chest pain, shortness of breath prior to the fall.  Denied LOC before or after the fall.  Denies any recent medication changes or changes to p.o. intake.  Denies any recent chest pain, shortness of breath with exertion, anginal symptoms.  Denies fever/chills, nausea/vomiting/diarrhea.     On arrival to ED, patient afebrile, HR 70s, normotensive, satting well on RA. Labs with mild hyponatremia 135, mild bump in baseline sCr to 1.42 from 1.24, no leukocytosis, chronic normocytic anemia with Hgb 10.6. Troponin drawn with mild elevation 58 -> 65. EKG with Afib with no acute ST changes. CTH with no traumatic injury. Wrist CR with comminuted fracture through distal radius with mild displacement and mild impaction, soft tissue edema, remote appearing fracture through head of 5th MCP. ED MD attempted reduction of patient's radial fracture with subsequent splinting. Patient admitted due to troponin elevation.     Hospital course: Radiography was repeated after bedside reduction, however did not show any improvement of approximation. Pt was evaluated by orthopedics the subsequent day, no acute surgery planned but recommended repeat imaging and office follow up in 1 week. Subsequently evaluated by PT/OT, recommended outpatient OT. Troponin trend was flat, and in the absence of any cardiac or equivalent symptoms no further evaluation was done; pt discharged home on 6/1 in stable condition.    Pertinent Physical Exam At Time of Discharge  Physical Exam  Vitals reviewed.   Constitutional:       General: He is awake. He is not in acute distress.     Appearance: Normal appearance. He is not toxic-appearing.   HENT:      Head: Normocephalic and atraumatic.      Right Ear: External ear normal.      Left Ear: External ear normal.      Nose: Nose normal.   Eyes:      Extraocular Movements:  Extraocular movements intact.   Cardiovascular:      Rate and Rhythm: Normal rate and regular rhythm.   Pulmonary:      Effort: Pulmonary effort is normal. No respiratory distress.   Abdominal:      General: There is no distension.   Musculoskeletal:         General: Signs of injury (Distal RUE in splint, without surrounding ecchymosis or erythema.) present. Normal range of motion.      Cervical back: Normal range of motion.   Skin:     General: Skin is warm and dry.   Neurological:      General: No focal deficit present.      Mental Status: He is alert. Mental status is at baseline.   Psychiatric:         Mood and Affect: Mood normal.         Behavior: Behavior normal. Behavior is cooperative.         Outpatient Follow-Up  Future Appointments   Date Time Provider Department Center   6/24/2024  3:00 PM Althea Brown MD [id number] [facility]   9/23/2024  1:00 PM Althea Brown MD [id number] [facility] CONNER was the attending physician for this patient. Coordination of care and discharge process provided by me on day of discharge was > 30 minutes.    Ruddy Sky DO  /[facility]

## 2024-06-01 NOTE — ED PROCEDURE NOTE
Procedure  Orthopaedic Injury Treatment - Fracture    Performed by: Asher Kumari MD  Authorized by: Asher Kumari MD    Consent:     Consent obtained:  Verbal    Consent given by:  Patient    Risks, benefits, and alternatives were discussed: yes      Risks discussed:  Nerve damage, pain and vascular damage    Alternatives discussed:  No treatment  Universal protocol:     Procedure explained and questions answered to patient or proxy's satisfaction: yes    Injury:     Injury location:  Wrist    Wrist injury location:  R wrist  Pre-procedure details:     Distal neurologic exam:  Normal    Distal perfusion: distal pulses strong    Sedation:     Sedation type:  None  Anesthesia:     Anesthesia method:  Local infiltration    Local anesthetic:  Bupivacaine 0.25% w/o epi and lidocaine 2% w/o epi  Procedure details:     Manipulation performed: yes      Immobilization:  Splint    Splint type:  Wrist  Post-procedure details:     Distal neurologic exam:  Normal    Distal perfusion: distal pulses strong      Procedure completion:  Tolerated  Comments:      Moderately unstable with reduction attempts with fracture fragments, held in placed after reduction once best alinement achieved and immediately splinted               Asher Kumari MD  05/31/24 [id number]

## 2024-06-01 NOTE — CARE PLAN
The patient's goals for the shift include sleep    The clinical goals for the shift include pt will remain hemodynaamically stable this shift    Over the shift, the patient did make progress toward the following goals. Pt sleeping on and off since admission.  Rt arm elevated on pillows. Good check to hand, fingers mobile. .  Denied pain or discomfort.  Safety measures maintained.

## 2024-06-01 NOTE — PROGRESS NOTES
Physical Therapy                 Therapy Communication Note    Patient Name: Sherri Grace  MRN: [id number]  Today's Date: 6/1/2024     Discipline: Physical Therapy    Missed Time: Screen only needed.      Comment:   Order received.  Chart reviewed.  89 y/o gentleman who fell and sustained a R distal radius fx. of his wrist.   No surgery.  Put in a soft splint.  He is very pleasant and agreed to show us his walking skills.  Indep. For sit<>stand and walked x 50 ft. without a device and steady.   He lives with his wife.  Looks to be at his baseline level of function.  Looks safe to return home.  No further PT needed.  Will discontinue PT.

## 2024-06-02 NOTE — HOSPITAL COURSE
Mr Grace is a 89yo man with dementia, CAD s/p PCI, Afib on apixaban admitted with troponin elevation and R wrist fracture following a fall.      Patient reports that he was moving a garbage can earlier this evening when he got his foot caught in a hole on the ground.  He stumbled and fell to the ground on an outstretched right hand.  He noted subsequent right wrist swelling and pain, and thus presented to the ER.  He denied any lightheadedness, chest pain, shortness of breath prior to the fall.  Denied LOC before or after the fall.  Denies any recent medication changes or changes to p.o. intake.  Denies any recent chest pain, shortness of breath with exertion, anginal symptoms.  Denies fever/chills, nausea/vomiting/diarrhea.     On arrival to ED, patient afebrile, HR 70s, normotensive, satting well on RA. Labs with mild hyponatremia 135, mild bump in baseline sCr to 1.42 from 1.24, no leukocytosis, chronic normocytic anemia with Hgb 10.6. Troponin drawn with mild elevation 58 -> 65. EKG with Afib with no acute ST changes. CTH with no traumatic injury. Wrist CR with comminuted fracture through distal radius with mild displacement and mild impaction, soft tissue edema, remote appearing fracture through head of 5th MCP. ED MD attempted reduction of patient's radial fracture with subsequent splinting. Patient admitted due to troponin elevation.     Hospital course: Radiography was repeated after bedside reduction, however did not show any improvement of approximation. Pt was evaluated by orthopedics the subsequent day, no acute surgery planned but recommended repeat imaging and office follow up in 1 week. Subsequently evaluated by PT/OT, recommended outpatient OT. Troponin trend was flat, and in the absence of any cardiac or equivalent symptoms no further evaluation was done; pt discharged home on 6/1 in stable condition.

## 2024-06-06 NOTE — DOCUMENTATION CLARIFICATION NOTE
"    PATIENT:               HOLLY DAVIS  ACCT #:                  [id number]  MRN:                       [id number]  :                       1925  ADMIT DATE:       2024 8:26 PM  DISCH DATE:        2024 6:02 PM  RESPONDING PROVIDER #:        [id number]          PROVIDER RESPONSE TEXT:    Hyponatremia is ruled out    CDI QUERY TEXT:    Clarification        Instruction:    Based on your assessment of the patient and the clinical information, please provide the requested documentation by clicking on the appropriate radio button and enter any additional information if prompted.    Question: Please clarify the diagnosis of CDI TO ENTER    When answering this query, please exercise your independent professional judgment. The fact that a question is being asked, does not imply that any particular answer is desired or expected.    The patient's clinical indicators include:  Clinical Information: 89 yo male admitted with right wrist fracture and elevated troponin.    -Documented Diagnosis: \"Mild Hyponatremia\"  -Laboratory Results: Na:135 (), 140 ()  -Clinical Manifestations/Physical Exam Findings:    Treatment: LR bolus  ml    Risk Factors: DALJIT  Options provided:  -- Hyponatremia clinically significant, as evidenced by and treated with, Please specify additional information below  -- Hyponatremia is ruled out  -- Other - I will add my own diagnosis  -- Refer to Clinical Documentation Reviewer    Query created by: Noemi Patel on 2024 5:14 PM      Electronically signed by:  TYRESE MARIE MD 2024 7:09 PM          "

## 2024-06-07 ENCOUNTER — OFFICE VISIT (OUTPATIENT)
Dept: ORTHOPEDIC SURGERY | Facility: CLINIC | Age: 89
End: 2024-06-07
Payer: MEDICARE

## 2024-06-07 DIAGNOSIS — S52.501A CLOSED FRACTURE OF DISTAL END OF RIGHT RADIUS, UNSPECIFIED FRACTURE MORPHOLOGY, INITIAL ENCOUNTER: Primary | ICD-10-CM

## 2024-06-07 PROCEDURE — 1159F MED LIST DOCD IN RCRD: CPT | Performed by: STUDENT IN AN ORGANIZED HEALTH CARE EDUCATION/TRAINING PROGRAM

## 2024-06-07 PROCEDURE — 99204 OFFICE O/P NEW MOD 45 MIN: CPT | Performed by: STUDENT IN AN ORGANIZED HEALTH CARE EDUCATION/TRAINING PROGRAM

## 2024-06-07 PROCEDURE — 1111F DSCHRG MED/CURRENT MED MERGE: CPT | Performed by: STUDENT IN AN ORGANIZED HEALTH CARE EDUCATION/TRAINING PROGRAM

## 2024-06-07 PROCEDURE — 25600 CLTX DST RDL FX/EPHYS SEP WO: CPT | Performed by: STUDENT IN AN ORGANIZED HEALTH CARE EDUCATION/TRAINING PROGRAM

## 2024-06-07 PROCEDURE — 1036F TOBACCO NON-USER: CPT | Performed by: STUDENT IN AN ORGANIZED HEALTH CARE EDUCATION/TRAINING PROGRAM

## 2024-06-07 PROCEDURE — 99214 OFFICE O/P EST MOD 30 MIN: CPT | Mod: 57 | Performed by: STUDENT IN AN ORGANIZED HEALTH CARE EDUCATION/TRAINING PROGRAM

## 2024-06-07 NOTE — PROGRESS NOTES
History of Present Illness:  Presents for evaluation of right.  The patient sustained an acute injury and notes pain and swelling.    The pain is sharp in nature, severe, worse with movement and better with rest.    On 531 and was seen at [facility].     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    Other conditions influencing health status 05/10/2019    History of cough    Personal history of other diseases of the circulatory system     History of cardiac arrhythmia    Personal history of other diseases of the circulatory system     History of hypertension    Personal history of other diseases of the circulatory system 12/28/2021    History of essential hypertension    Personal history of other diseases of the musculoskeletal system and connective tissue 12/28/2021    History of arthritis    Personal history of other diseases of the nervous system and sense organs 12/28/2021    History of eye problem    Snoring     Snoring    Vasovagal near syncope 11/07/2013    Weight loss 02/08/2023         Physical Examination:  General: Alert and oriented to person, place, and time.  No acute distress and breathing comfortably: Pleasant and cooperative with examination.  HEENT: Head is normocephalic and atraumatic.  Neck: Supple, no visible swelling.  Cardiovascular: No palpable tachycardia  Lungs: No audible wheezing or labored breathing  Abdomen: Nondistended.  On musculoskeletal examination, the patient has full elbow range of motion. In regards to the wrist, there is swelling with some deformity. There is ecchymosis of the wrist, but the skin is intact. Range of motion and strength are limited secondary to pain. There is tenderness to palpation of the distal radius. There is no obvious tenderness to palpation about the scaphoid or the SL interval. Sensation and motor  function are intact in the radial, ulnar, and median nerve distribution. The patient has intact flexor and extensor function, but has difficulty making a full fist secondary to pain. The hand itself is warm and well perfused. The contralateral hand and wrist are normal to inspection, range of motion, stability, and strength.      Imaging:  AP, lateral, and oblique radiographs of the right wrist demonstrate extra-articular dorsally angulated distal radius fracture    Assessment:  Patient with a right DRF    Plan:  I had long discussion with the patient regarding the fracture. I discussed the risks/benefits/expected outcomes of both non-operative and operative management. Based on the current alignment of the fracture, the patients medical history, and the patients preference, we have elected to proceed with a well-molded cast. The patient understands the risks associated with fracture care, which include but are not limited to, malunion, nonunion, loss of reduction or alignment, post traumatic arthrosis, avascular necrosis, chronic pain or stiffness, tendon rupture, compression neuropathy, and potential need for future surgery. In order to minimize the risk of fracture displacement, I will follow the fracture closely with repeat radiographs. I answered all of their questions.    He is very interested in a waterproof cast due to swimming over the summer.  Did discuss that they could give [facility] on Monday for cast charge for conversion to waterproof    Follow up:  1 month with XR out of cast    Yadira Portillo MD

## 2024-06-10 ENCOUNTER — OFFICE VISIT (OUTPATIENT)
Dept: ORTHOPEDIC SURGERY | Facility: CLINIC | Age: 89
End: 2024-06-10
Payer: MEDICARE

## 2024-06-10 DIAGNOSIS — S52.501A CLOSED FRACTURE OF DISTAL END OF RIGHT RADIUS, UNSPECIFIED FRACTURE MORPHOLOGY, INITIAL ENCOUNTER: Primary | ICD-10-CM

## 2024-06-10 PROCEDURE — 99024 POSTOP FOLLOW-UP VISIT: CPT | Performed by: STUDENT IN AN ORGANIZED HEALTH CARE EDUCATION/TRAINING PROGRAM

## 2024-06-10 PROCEDURE — 29075 APPL CST ELBW FNGR SHORT ARM: CPT | Performed by: STUDENT IN AN ORGANIZED HEALTH CARE EDUCATION/TRAINING PROGRAM

## 2024-06-10 NOTE — DOCUMENTATION CLARIFICATION NOTE
"    PATIENT:               HOLLY DAVIS  ACCT #:                  [id number]  MRN:                       [id number]  :                       1925  ADMIT DATE:       2024 8:26 PM  DISCH DATE:        2024 6:02 PM  RESPONDING PROVIDER #:        [id number]          PROVIDER RESPONSE TEXT:    Non-ischemic myocardial injury    CDI QUERY TEXT:    Clarification      Instruction:    Based on your assessment of the patient and the clinical information, please provide the requested documentation by clicking on the appropriate radio button and enter any additional information if prompted.    Question: Is there a diagnosis indicative of the patient elevated Troponins and symptoms    When answering this query, please exercise your independent professional judgment. The fact that a question is being asked, does not imply that any particular answer is desired or expected.    The patient's clinical indicators include:  Clinical Information: 89 yo male admitted with right wrist fracture and elevated troponin.    Clinical Indicators:  Vitals (2020) Temp-36.2 HR-75 RR-18 BP-126/69 SPO2-99 RA    Troponin: 58 ( 2147), 65 ( 2244), 65 ( 0102)  EKG: AFIB with no acute ST changes  ECHO: n/a  Cardiology consult: n/a  Chest pain: n/a      Dr. Rick: \"Denies any recent chest pain, shortness of breath with exertion, anginal symptoms.\"  \"Patient admitted due to troponin elevation\"  \"Patient does not have any cardiac sx, and no EKG changes suggestive of ACS  -resume home ASA\"    Treatment: monitoring, ASA, EKG,    Risk Factors: elevated troponin  Options provided:  -- Non-ischemic myocardial injury  -- Troponin elevation due to other, Please specify additional information below  -- Other - I will add my own diagnosis  -- Refer to Clinical Documentation Reviewer    Query created by: Noemi Patel on 2024 9:10 AM      Electronically signed by:  TYRESE RICK MD 6/10/2024 4:22 PM          "

## 2024-06-10 NOTE — PROGRESS NOTES
History of Present Illness  Patient returns today for evaluation of left distal radius fracture.  Presents today to have waterproof cast placed..     Physical Examination:  Left upper extremity:  The patient appears to be their stated age, is in no apparent distress, and is oriented x3. The patients mood and affect are appropriate. The patients gait is normal. The examination of the limb in question was performed in comparison to the contralateral limb.    On musculoskeletal examination, tenderness palpation of the dorsal aspect of the left distal radius    Sensation and motor function are intact in the radial, and median nerve distribution. There is no obvious thenar atrophy, and thenar strength is 5/5. There is no intrinsic atrophy, and intrinsic strength is 5/5.  The patient can make a full composite fist. The hand itself is warm and well perfused. The skin is intact throughout. The contralateral hand/wrist are normal to inspection, range of motion, stability, and strength.      Assessment:  Patient with left distal radius fracture.  Waterproof cast placed today.    Plan:   Follow-up in 4 weeks with x-ray out of cast    Yadira Johns MD

## 2024-06-10 NOTE — DOCUMENTATION CLARIFICATION NOTE
"    PATIENT:               HOLLY DAVIS  ACCT #:                  [id number]  MRN:                       [id number]  :                       1925  ADMIT DATE:       2024 8:26 PM  DISCH DATE:        2024 6:02 PM  RESPONDING PROVIDER #:        [id number]          PROVIDER RESPONSE TEXT:    Acute Kidney Injury is ruled out    CDI QUERY TEXT:    Clarification        Instruction:    Based on your assessment of the patient and the clinical information, please provide the requested documentation by clicking on the appropriate radio button and enter any additional information if prompted.    Question: Please clarify the diagnosis of Acute Kidney Injury    When answering this query, please exercise your independent professional judgment. The fact that a question is being asked, does not imply that any particular answer is desired or expected.    The patient's clinical indicators include:  Clinical Information: 89 yo male admitted with right wrist fracture and elevated troponin.    Clinical Indicators:  Vitals (2020) Temp-36.2 HR-75 RR-18 BP-126/69 SPO2-99 RA    -Baseline Creatinine:   1.24  -SCr lab values: 1.42, 1.40  -Electrolyte lab values: Na:135  -Nephrology consult: n/a     DCN Dr. Sky: \"mild bump in baseline sCr to 1.42 from 1.24\"    Treatment:  ml bolus IV    Risk Factors: dehydration  Options provided:  -- Acute Kidney Injury is ruled out  -- Acute Kidney Injury ruled in as evidenced by, Please specify additional information below  -- Other - I will add my own diagnosis  -- Refer to Clinical Documentation Reviewer    Query created by: Noemi Patel on 2024 9:10 AM      Electronically signed by:  TYRESE MARIE MD 6/10/2024 4:22 PM          "

## 2024-06-12 ENCOUNTER — PATIENT OUTREACH (OUTPATIENT)
Dept: PRIMARY CARE | Facility: CLINIC | Age: 89
End: 2024-06-12
Payer: MEDICARE

## 2024-06-12 DIAGNOSIS — I10 HYPERTENSION, UNSPECIFIED TYPE: ICD-10-CM

## 2024-06-12 DIAGNOSIS — F03.A3 MILD DEMENTIA WITH MOOD DISTURBANCE, UNSPECIFIED DEMENTIA TYPE (MULTI): ICD-10-CM

## 2024-06-12 NOTE — PROGRESS NOTES
Spoke with dtr who is active with pt's care. Pt fell taking out the garbage. Fractured rt arm which is now in a cast. Dtr assists with med management. Aware of upcoming appt with PCP on 6/24/24. Aware to call this nurse with any questions or concerns.

## 2024-06-14 NOTE — DOCUMENTATION CLARIFICATION NOTE
"    PATIENT:               HOLLY DAVIS  ACCT #:                  [id number]  MRN:                       [id number]  :                       1925  ADMIT DATE:       2024 8:26 PM  DISCH DATE:        2024 6:02 PM  RESPONDING PROVIDER #:        [id number]          PROVIDER RESPONSE TEXT:    Troponin elevation due to other not clinically significant    CDI QUERY TEXT:    Clarification      Instruction:    Based on your assessment of the patient and the clinical information, please provide the requested documentation by clicking on the appropriate radio button and enter any additional information if prompted.    Question: Is there a diagnosis indicative of the patient elevated Troponins and symptoms    When answering this query, please exercise your independent professional judgment. The fact that a question is being asked, does not imply that any particular answer is desired or expected.    The patient's clinical indicators include:  Clinical Information: 89 yo male admitted with right wrist fracture and elevated troponin.    Clinical Indicators:  Vitals (2020) Temp-36.2 HR-75 RR-18 BP-126/69 SPO2-99 RA    Troponin: 58 ( 2147), 65 ( 2244), 65 ( 0102)  EKG: AFIB with no acute ST changes  ECHO: n/a  Cardiology consult: n/a  Chest pain: n/a     HP Dr. Rick: \"Denies any recent chest pain, shortness of breath with exertion, anginal symptoms.\"  \"Patient admitted due to troponin elevation\"  \"Patient does not have any cardiac sx, and no EKG changes suggestive of ACS  -resume home ASA\"    Treatment: monitoring, ASA, EKG,    Risk Factors: elevated troponin  Options provided:  -- Non-ischemic myocardial injury  -- Troponin elevation due to other, Please specify additional information below  -- Other - I will add my own diagnosis  -- Refer to Clinical Documentation Reviewer    Query created by: Noemi Patel on 2024 5:10 PM      Electronically signed by:  ISAURA ALVAREZ DO 2024 5:05 " PM

## 2024-06-14 NOTE — DOCUMENTATION CLARIFICATION NOTE
"    PATIENT:               HOLLY DAVIS  ACCT #:                  [id number]  MRN:                       [id number]  :                       1925  ADMIT DATE:       2024 8:26 PM  DISCH DATE:        2024 6:02 PM  RESPONDING PROVIDER #:        [id number]          PROVIDER RESPONSE TEXT:    Acute Kidney Injury is ruled out    CDI QUERY TEXT:    Clarification        Instruction:    Based on your assessment of the patient and the clinical information, please provide the requested documentation by clicking on the appropriate radio button and enter any additional information if prompted.    Question: Please clarify the diagnosis of Acute Kidney Injury    When answering this query, please exercise your independent professional judgment. The fact that a question is being asked, does not imply that any particular answer is desired or expected.    The patient's clinical indicators include:  Clinical Information: 89 yo male admitted with right wrist fracture and elevated troponin.    Clinical Indicators:  Vitals (2020) Temp-36.2 HR-75 RR-18 BP-126/69 SPO2-99 RA    -Baseline Creatinine:   1.24  -SCr lab values: 1.42, 1.40  -Electrolyte lab values: Na:135  -Nephrology consult: n/a     DCN Dr. Alvarez: \"mild bump in baseline sCr to 1.42 from 1.24\"    Treatment:  ml bolus IV    Risk Factors: dehydration  Options provided:  -- Acute Kidney Injury is ruled out  -- Acute Kidney Injury ruled in as evidenced by, Please specify additional information below  -- Other - I will add my own diagnosis  -- Refer to Clinical Documentation Reviewer    Query created by: Noemi Patel on 2024 5:21 PM      Electronically signed by:  ISAURA ALVAREZ DO 2024 5:05 PM          "

## 2024-06-24 ENCOUNTER — APPOINTMENT (OUTPATIENT)
Dept: PRIMARY CARE | Facility: CLINIC | Age: 89
End: 2024-06-24
Payer: MEDICARE

## 2024-07-12 ENCOUNTER — PATIENT OUTREACH (OUTPATIENT)
Dept: PRIMARY CARE | Facility: CLINIC | Age: 89
End: 2024-07-12
Payer: MEDICARE

## 2024-07-12 DIAGNOSIS — I10 HYPERTENSION, UNSPECIFIED TYPE: ICD-10-CM

## 2024-07-12 DIAGNOSIS — F03.A3 MILD DEMENTIA WITH MOOD DISTURBANCE, UNSPECIFIED DEMENTIA TYPE (MULTI): ICD-10-CM

## 2024-07-12 NOTE — PROGRESS NOTES
Pt is doing well. Adjusting to decline in wife's health. Dtr doesn't feel pt understands what's happening. Cast maintained to rt arm. Cast being removed on Monday. Has had no other falls. Current med list up to date and no refills needed at this time. Dtr is aware to call this nurse with any questions or concerns.

## 2024-07-15 ENCOUNTER — HOSPITAL ENCOUNTER (OUTPATIENT)
Dept: RADIOLOGY | Facility: CLINIC | Age: 89
Discharge: HOME | End: 2024-07-15
Payer: MEDICARE

## 2024-07-15 ENCOUNTER — OFFICE VISIT (OUTPATIENT)
Dept: ORTHOPEDIC SURGERY | Facility: CLINIC | Age: 89
End: 2024-07-15
Payer: MEDICARE

## 2024-07-15 DIAGNOSIS — S52.501A CLOSED FRACTURE OF DISTAL END OF RIGHT RADIUS, UNSPECIFIED FRACTURE MORPHOLOGY, INITIAL ENCOUNTER: ICD-10-CM

## 2024-07-15 PROCEDURE — 99211 OFF/OP EST MAY X REQ PHY/QHP: CPT | Performed by: STUDENT IN AN ORGANIZED HEALTH CARE EDUCATION/TRAINING PROGRAM

## 2024-07-15 PROCEDURE — 73100 X-RAY EXAM OF WRIST: CPT | Mod: RIGHT SIDE | Performed by: STUDENT IN AN ORGANIZED HEALTH CARE EDUCATION/TRAINING PROGRAM

## 2024-07-15 PROCEDURE — L3908 WHO COCK-UP NONMOLDE PRE OTS: HCPCS | Performed by: STUDENT IN AN ORGANIZED HEALTH CARE EDUCATION/TRAINING PROGRAM

## 2024-07-15 PROCEDURE — 73100 X-RAY EXAM OF WRIST: CPT | Mod: RT

## 2024-07-15 PROCEDURE — 99024 POSTOP FOLLOW-UP VISIT: CPT | Performed by: STUDENT IN AN ORGANIZED HEALTH CARE EDUCATION/TRAINING PROGRAM

## 2024-07-15 NOTE — PROGRESS NOTES
History of Present Illness:  Patient returns today endorsing mild pain.  Denies any numbness or tingling.     Review of Systems   GENERAL: Negative for malaise, significant weight loss, fever  MUSCULOSKELETAL: see HPI  NEURO:  Negative    Physical Examination:  Mild swelling, skin healthy and intact  Mild tenderness to palpation over distal radius   + EPL, FPL, SAPAN  + SILT median, radial, ulnar nerve distribution   Good cap refill    Imaging:  Appropriate position and alignment    Assessment:   Patient with a right distal radius fracture, healing    Plan:  Immobilization: cast/splint removed and plan for removable wrist brace to be weaned out of over next two weeks  Encouraged ROM of digits.  Discussed rehab goals and return to activity.  Follow-up: 1 month for motion check

## 2024-07-22 ENCOUNTER — APPOINTMENT (OUTPATIENT)
Dept: RADIOLOGY | Facility: HOSPITAL | Age: 89
End: 2024-07-22
Payer: MEDICARE

## 2024-07-22 ENCOUNTER — HOSPITAL ENCOUNTER (EMERGENCY)
Facility: HOSPITAL | Age: 89
Discharge: HOME | End: 2024-07-22
Attending: EMERGENCY MEDICINE
Payer: MEDICARE

## 2024-07-22 VITALS
DIASTOLIC BLOOD PRESSURE: 80 MMHG | SYSTOLIC BLOOD PRESSURE: 157 MMHG | TEMPERATURE: 97.9 F | HEIGHT: 60 IN | WEIGHT: 119.05 LBS | BODY MASS INDEX: 23.37 KG/M2 | RESPIRATION RATE: 18 BRPM | OXYGEN SATURATION: 98 % | HEART RATE: 53 BPM

## 2024-07-22 DIAGNOSIS — R22.32 LOCALIZED SWELLING OF FINGER OF LEFT HAND: Primary | ICD-10-CM

## 2024-07-22 PROCEDURE — 99283 EMERGENCY DEPT VISIT LOW MDM: CPT

## 2024-07-22 PROCEDURE — 73130 X-RAY EXAM OF HAND: CPT | Mod: LT

## 2024-07-22 PROCEDURE — 73130 X-RAY EXAM OF HAND: CPT | Mod: LEFT SIDE | Performed by: RADIOLOGY

## 2024-07-22 ASSESSMENT — LIFESTYLE VARIABLES
HAVE YOU EVER FELT YOU SHOULD CUT DOWN ON YOUR DRINKING: NO
EVER HAD A DRINK FIRST THING IN THE MORNING TO STEADY YOUR NERVES TO GET RID OF A HANGOVER: NO
EVER FELT BAD OR GUILTY ABOUT YOUR DRINKING: NO
HAVE PEOPLE ANNOYED YOU BY CRITICIZING YOUR DRINKING: NO
TOTAL SCORE: 0

## 2024-07-22 ASSESSMENT — COLUMBIA-SUICIDE SEVERITY RATING SCALE - C-SSRS
1. IN THE PAST MONTH, HAVE YOU WISHED YOU WERE DEAD OR WISHED YOU COULD GO TO SLEEP AND NOT WAKE UP?: NO
6. HAVE YOU EVER DONE ANYTHING, STARTED TO DO ANYTHING, OR PREPARED TO DO ANYTHING TO END YOUR LIFE?: NO
2. HAVE YOU ACTUALLY HAD ANY THOUGHTS OF KILLING YOURSELF?: NO

## 2024-07-22 ASSESSMENT — PAIN DESCRIPTION - ORIENTATION: ORIENTATION: LEFT

## 2024-07-22 ASSESSMENT — PAIN DESCRIPTION - LOCATION: LOCATION: FINGER (COMMENT WHICH ONE)

## 2024-07-22 ASSESSMENT — PAIN SCALES - GENERAL: PAINLEVEL_OUTOF10: 1

## 2024-07-22 ASSESSMENT — PAIN - FUNCTIONAL ASSESSMENT: PAIN_FUNCTIONAL_ASSESSMENT: 0-10

## 2024-07-22 NOTE — DISCHARGE INSTRUCTIONS
Please utilize RICE protocol for swelling.    Seek immediate medical attention should patient have:  Worsening swelling of the finger, decrease sensation, paleness of the finger, skin color changes, inability to move finger, or any worsening or concerning symptoms.     [facility]  [address] , [place] [address]  Walk-ins 8a - 4p M-F  [phone]

## 2024-07-22 NOTE — ED PROVIDER NOTES
EMERGENCY DEPARTMENT ENCOUNTER      Pt Name: Sherri Grace  MRN: [id number]  Birthdate 6/7/1925  Date of evaluation: 7/22/2024  Provider: Romario Toure MD    CHIEF COMPLAINT       Chief Complaint   Patient presents with    Fall     Patients family states he fell yesterday patient doesn't remember has dementia c/o left ring finger swelling and pain. Patient is on elequis no LOC     Subjective    HISTORY OF PRESENT ILLNESS    89-year-old male with a history of HTN, dementia Aox1 at baseline, and A fib on eliquis presenting to the ED with a complaint of a swelling left ring finger. Son at bedside reports that he noticed his left ring finger has been swelling over the past day and believes he may have bumped his hand on the sliding door as he does not always move his hand out of the way when he closes. Denies any falls. Patient endorsing mild pain to his hand but no other pains. Son reports he has been feeling well and denies any URI symptoms, GI symptoms, or urinary symptoms.      History provided by:  Relative and patient      Nursing Notes were reviewed.    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    Other conditions influencing health status 05/10/2019    History of cough    Personal history of other diseases of the circulatory system     History of cardiac arrhythmia    Personal history of other diseases of the circulatory system     History of hypertension    Personal history of other diseases of the circulatory system 12/28/2021    History of essential hypertension    Personal history of other diseases of the musculoskeletal system and connective tissue 12/28/2021    History of arthritis    Personal history of other diseases of the nervous system and sense organs 12/28/2021    History of eye problem    Snoring     Snoring    Vasovagal near syncope 11/07/2013    Weight loss 02/08/2023         SURGICAL HISTORY       Past Surgical History:   Procedure Laterality  Date    HERNIA REPAIR  07/02/2015    Hernia Repair    OTHER SURGICAL HISTORY  06/02/2015    Previous Stent Placement    OTHER SURGICAL HISTORY  04/19/2019    Colonoscopy         CURRENT MEDICATIONS       Previous Medications    APIXABAN (ELIQUIS) 2.5 MG TABLET    Take 1 tablet (2.5 mg) by mouth 2 times a day.    ASCORBIC ACID (VITAMIN C) 500 MG TABLET    Take 1 tablet (500 mg) by mouth once daily.    ASPIRIN ORAL    Take 81 mg by mouth once daily.    CHOLECALCIFEROL (VITAMIN D-3) 50 MCG (2000 UT) TABLET    Take 1 tablet (50 mcg) by mouth once daily.    CYANOCOBALAMIN (VITAMIN B-12) 500 MCG TABLET    Take 1 tablet (500 mcg) by mouth once daily.    FAMOTIDINE (PEPCID) 20 MG TABLET    Take 1 tablet (20 mg) by mouth 2 times a day.    FERROUS SULFATE 325 (65 FE) MG EC TABLET    Take 1 tablet by mouth every other day. Do not crush, chew, or split.    IPRATROPIUM (ATROVENT) 42 MCG (0.06 %) NASAL SPRAY    2 sprays per nostril TID as needed and before eating.    LISINOPRIL 40 MG TABLET    Lisinopril 40 MG Oral Tablet  Quantity: 90  Refills: 0    Start : 28-May-2021 Active    TAMSULOSIN (FLOMAX) 0.4 MG 24 HR CAPSULE    TAKE 1 CAPSULE BY MOUTH 30 MINUTES FOLLOWING THE SAME MEAL EACH DAY    VIT C/E/ZN/COPPR/LUTEIN/ZEAXAN (PRESERVISION AREDS-2 ORAL)    Take 1 tablet by mouth 2 times a day.    VITAMIN B COMPLEX ORAL    Take 1 tablet by mouth 1 (one) time each day.       ALLERGIES     Atorvastatin, Esomeprazole magnesium, and Omeprazole    FAMILY HISTORY       Family History   Problem Relation Name Age of Onset    Other (Cerebrovascular accident) Mother      Hypertension Mother      Hypertension Father      Heart attack Father      Other (MALIGNANT NEOPLASM) Other         SOCIAL HISTORY       Social History     Socioeconomic History    Marital status:    Tobacco Use    Smoking status: Never    Smokeless tobacco: Never   Vaping Use    Vaping status: Never Used   Substance and Sexual Activity    Alcohol use: Not Currently     Drug use: Never     Social Determinants of Health     Financial Resource Strain: Low Risk  (6/1/2024)    Overall Financial Resource Strain (CARDIA)     Difficulty of Paying Living Expenses: Not very hard   Transportation Needs: No Transportation Needs (6/1/2024)    PRAPARE - Transportation     Lack of Transportation (Medical): No     Lack of Transportation (Non-Medical): No   Physical Activity: Unknown (6/1/2024)    Exercise Vital Sign     Minutes of Exercise per Session: 0 min   Stress: No Stress Concern Present (6/1/2024)    [employer] [place] [employer] - Occupational Stress Questionnaire     Feeling of Stress : Not at all   Social Connections: Socially Integrated (6/1/2024)    Social Connection and Isolation Panel [NHANES]     Frequency of Communication with Friends and Family: More than three times a week     Frequency of Social Gatherings with Friends and Family: More than three times a week     Attends [employer] Services: 1 to 4 times per year     Active Member of Clubs or Organizations: No     Attends Club or Organization Meetings: 1 to 4 times per year     Marital Status:    Intimate Partner Violence: Patient Declined (6/1/2024)    Humiliation, Afraid, Rape, and Kick questionnaire     Fear of Current or Ex-Partner: Patient declined     Emotionally Abused: Patient declined     Physically Abused: Patient declined     Sexually Abused: Patient declined   Housing Stability: Low Risk  (6/1/2024)    Housing Stability Vital Sign     Unable to Pay for Housing in the Last Year: No     Number of Places Lived in the Last Year: 1     Unstable Housing in the Last Year: No       SCREENINGS                       Objective    PHYSICAL EXAM    (up to 7 for level 4, 8 or more for level 5)     ED Triage Vitals [07/22/24 1328]   Temperature Heart Rate Respirations BP   36.6 °C (97.9 °F) 67 19 140/75      Pulse Ox Temp Source Heart Rate Source Patient Position   98 % Temporal Monitor Sitting      BP Location  FiO2 (%)     Right arm --       Physical Exam  Vitals and nursing note reviewed.   Constitutional:       General: He is not in acute distress.     Appearance: He is well-developed.   HENT:      Head: Normocephalic and atraumatic.      Right Ear: External ear normal.      Left Ear: External ear normal.      Nose: Nose normal. No congestion or rhinorrhea.      Mouth/Throat:      Mouth: Mucous membranes are moist.      Pharynx: No oropharyngeal exudate or posterior oropharyngeal erythema.   Eyes:      General:         Right eye: No discharge.         Left eye: No discharge.      Extraocular Movements: Extraocular movements intact.      Conjunctiva/sclera: Conjunctivae normal.   Cardiovascular:      Rate and Rhythm: Normal rate and regular rhythm.      Pulses: Normal pulses.           Radial pulses are 2+ on the right side and 2+ on the left side.      Heart sounds: No murmur (grade 3 or above) heard.     No friction rub.   Pulmonary:      Effort: Pulmonary effort is normal. No respiratory distress.      Breath sounds: Normal breath sounds. No stridor. No wheezing or rales.   Abdominal:      General: There is no distension.      Palpations: Abdomen is soft.      Tenderness: There is no abdominal tenderness. There is no guarding.   Musculoskeletal:         General: Swelling (left fourth proximal phalynx) and tenderness (left fourth proximal phalynx) present. No deformity or signs of injury. Normal range of motion.      Cervical back: Neck supple.      Right lower leg: No edema.      Left lower leg: No edema.   Skin:     General: Skin is warm and dry.      Capillary Refill: Capillary refill takes less than 2 seconds.      Coloration: Skin is not jaundiced or pale.      Findings: No lesion or rash.   Neurological:      General: No focal deficit present.      Mental Status: He is alert. Mental status is at baseline.      Cranial Nerves: No cranial nerve deficit.      Sensory: No sensory deficit.      Motor: No weakness.    Psychiatric:         Mood and Affect: Mood normal.         Behavior: Behavior normal.         Thought Content: Thought content normal.         Judgment: Judgment normal.          DIAGNOSTIC RESULTS     LABS:  Labs Reviewed - No data to display    All other labs were within normal range or not returned as of this dictation.    Imaging  XR hand left 3+ views    (Results Pending)            [facility]/EMERGENCY DEPARTMENT COURSE:   Final Impression: No diagnosis found.    Medical Decision Making  89-year-old male with a history significant for dementia AO x 1 and A-fib on Eliquis presenting to the ED with complaint of left hand swelling.  Patient is afebrile, sinus, normotensive, saturating well on room air, and in no acute distress.  On exam there is mild tenderness palpation over the left fourth proximal phalanx.  History obtained from:    Diagnostic interpretations performed by me: Per ED course below.  Social determinants of health affecting disposition: Lives with son and likes to swim  Management discussed with: Supervising attending, Dr. Canchola    No fracture appreciated by the radiologist of the x-ray of the hand.  Patient and family updated on findings and reassured.  Instructed to utilize finger splint provided for protection and promote healing.  Instructed to utilize RICE protocol.  Advised to follow-up with orthopedics in 1 day.  Strict return precaution provided.          ED Course as of 07/22/24 1520   Mon Jul 22, 2024   1433 XR hand left 3+ views  On my interpretation, severe DJD. No obvious fracture or dislocation appreciated. [ES]      ED Course User Index  [ES] Romario Toure MD         Diagnoses as of 07/22/24 1520   Localized swelling of finger of left hand - fourth proximal phalynx        Patient and or family in agreement and understanding of treatment plan.  All questions answered.      I reviewed the case with the attending ED physician. The attending ED physician agrees with the plan. Patient and/or  patient´s representative was counseled regarding labs, imaging, likely diagnosis, and plan. All questions were answered.    ED Medications administered this visit:  Medications - No data to display    New Prescriptions from this visit:    New Prescriptions    No medications on file       Follow-up:  No follow-up provider specified.     (Please note that portions of this note were completed with a voice recognition program.  Efforts were made to edit the dictations but occasionally words are mis-transcribed.)    Procedures  Procedures      Romario Toure MD  Resident  07/22/24 [id number]

## 2024-07-26 ENCOUNTER — APPOINTMENT (OUTPATIENT)
Dept: ORTHOPEDIC SURGERY | Facility: CLINIC | Age: 89
End: 2024-07-26
Payer: MEDICARE

## 2024-07-29 ENCOUNTER — OFFICE VISIT (OUTPATIENT)
Dept: ORTHOPEDIC SURGERY | Facility: CLINIC | Age: 89
End: 2024-07-29
Payer: MEDICARE

## 2024-07-29 ENCOUNTER — HOSPITAL ENCOUNTER (OUTPATIENT)
Dept: RADIOLOGY | Facility: CLINIC | Age: 89
Discharge: HOME | End: 2024-07-29
Payer: MEDICARE

## 2024-07-29 DIAGNOSIS — S62.605A CLOSED NONDISPLACED FRACTURE OF PHALANX OF LEFT RING FINGER, UNSPECIFIED PHALANX, INITIAL ENCOUNTER: ICD-10-CM

## 2024-07-29 PROCEDURE — 99213 OFFICE O/P EST LOW 20 MIN: CPT | Performed by: ORTHOPAEDIC SURGERY

## 2024-07-29 PROCEDURE — 73140 X-RAY EXAM OF FINGER(S): CPT | Mod: LEFT SIDE | Performed by: RADIOLOGY

## 2024-07-29 PROCEDURE — 73140 X-RAY EXAM OF FINGER(S): CPT | Mod: LT

## 2024-07-29 PROCEDURE — 99214 OFFICE O/P EST MOD 30 MIN: CPT | Performed by: ORTHOPAEDIC SURGERY

## 2024-07-29 NOTE — PROGRESS NOTES
History present illness: Patient presents today with his son for evaluation of the left ring finger.  The patient was noted to have ecchymosis and swelling localized to left ring finger after presumed injury date last Monday.  The patient's son states that overall he is doing better.      Past medical history: The patient's past medical history, family history, social history, and review of systems were documented on the patient medical intake.  The updated data was reviewed in the electronic medical record.  History is negative except otherwise stated in history of present illness.        Physical examination:  General: Alert and oriented to person, place, and time.  No acute distress and breathing comfortably: Pleasant and cooperative with examination.  HEENT: Head is normocephalic and atraumatic.  Neck: Supple, no visible swelling.  Cardiovascular: No palpable tachycardia  Lungs: No audible wheezing or labored breathing  Abdomen: Nondistended.  Extremities: Evaluation of the left upper extremity finds the patient had palpable radial artery at the wrist with brisk capillary refill to all digits.  Patient has intact sensation to axillary radial median and ulnar nerves.  There are no open wounds.  There are no signs of infection.  There is no evidence of lymphedema or lymphatic streaking.  The patient has supple compartments to left arm forearm and hand.  Mild swelling and tenderness along with mild ecchymosis to left ring finger localized to PIP joint.  No gross deformity to stressing.  Flexion extension arc comparable to contralateral.      Radiology:      Assessment: Left ring finger PIP sprain      Plan: Treatment options were discussed.  Recommendations made for discontinuation of AlumaFoam splint immobilization and activities to tolerance.  Patient and his son are agreeable with this strategy.  Follow-up with me on an as-needed basis.        Procedure:

## 2024-08-12 ENCOUNTER — OFFICE VISIT (OUTPATIENT)
Dept: ORTHOPEDIC SURGERY | Facility: CLINIC | Age: 89
End: 2024-08-12
Payer: MEDICARE

## 2024-08-12 DIAGNOSIS — S52.501A CLOSED FRACTURE OF DISTAL END OF RIGHT RADIUS, UNSPECIFIED FRACTURE MORPHOLOGY, INITIAL ENCOUNTER: Primary | ICD-10-CM

## 2024-08-12 PROCEDURE — 1159F MED LIST DOCD IN RCRD: CPT | Performed by: STUDENT IN AN ORGANIZED HEALTH CARE EDUCATION/TRAINING PROGRAM

## 2024-08-12 PROCEDURE — 99211 OFF/OP EST MAY X REQ PHY/QHP: CPT | Performed by: STUDENT IN AN ORGANIZED HEALTH CARE EDUCATION/TRAINING PROGRAM

## 2024-08-12 PROCEDURE — 1036F TOBACCO NON-USER: CPT | Performed by: STUDENT IN AN ORGANIZED HEALTH CARE EDUCATION/TRAINING PROGRAM

## 2024-08-12 PROCEDURE — 99024 POSTOP FOLLOW-UP VISIT: CPT | Performed by: STUDENT IN AN ORGANIZED HEALTH CARE EDUCATION/TRAINING PROGRAM

## 2024-08-12 NOTE — PROGRESS NOTES
History of Present Illness:  Patient returns today endorsing no pain.  Denies any numbness or tingling.     Review of Systems   GENERAL: Negative for malaise, significant weight loss, fever  MUSCULOSKELETAL: see HPI  NEURO:  Negative    Physical Examination:  Mild swelling, skin healthy and intact  Mild tenderness to palpation over distal radius   + EPL, FPL, SAPNA  + SILT median, radial, ulnar nerve distribution   Good cap refill      Assessment:   Patient with a right distal radius fracture    Plan:  All immobilization has been discontinued.  Can progress weight-bear as tolerated  Encouraged ROM of digits.  Discussed rehab goals and return to activity.  Follow-up: As needed

## 2024-08-14 ENCOUNTER — PATIENT OUTREACH (OUTPATIENT)
Dept: PRIMARY CARE | Facility: CLINIC | Age: 89
End: 2024-08-14
Payer: MEDICARE

## 2024-08-14 DIAGNOSIS — I10 HYPERTENSION, UNSPECIFIED TYPE: ICD-10-CM

## 2024-08-14 DIAGNOSIS — F03.A3 MILD DEMENTIA WITH MOOD DISTURBANCE, UNSPECIFIED DEMENTIA TYPE (MULTI): ICD-10-CM

## 2024-08-14 NOTE — PROGRESS NOTES
Spoke with Everett valdes. Pt has been doing well. Had ortho follow up regarding rt radius fx. Is having no pain and all restrictions are removed to WBAT. Feels pt gets more angry, aggressive and anxious at night. Will discuss with PCP next month at appt on 9/4. Advised to call this nurse with any other question or concerns.

## 2024-09-04 ENCOUNTER — APPOINTMENT (OUTPATIENT)
Dept: PRIMARY CARE | Facility: CLINIC | Age: 89
End: 2024-09-04
Payer: MEDICARE

## 2024-09-09 ENCOUNTER — PATIENT OUTREACH (OUTPATIENT)
Dept: PRIMARY CARE | Facility: CLINIC | Age: 89
End: 2024-09-09
Payer: MEDICARE

## 2024-09-09 DIAGNOSIS — F03.A0 MILD DEMENTIA, UNSPECIFIED DEMENTIA TYPE, UNSPECIFIED WHETHER BEHAVIORAL, PSYCHOTIC, OR MOOD DISTURBANCE OR ANXIETY (MULTI): ICD-10-CM

## 2024-09-09 DIAGNOSIS — I10 HYPERTENSION, UNSPECIFIED TYPE: ICD-10-CM

## 2024-09-09 NOTE — PROGRESS NOTES
No significant changes noted with pt. Rt arm fx healing well. No changes noted with meds. Has had no recent falls. Dtr aware to call this nurse with any any questions or concerns.

## 2024-09-23 ENCOUNTER — APPOINTMENT (OUTPATIENT)
Dept: PRIMARY CARE | Facility: CLINIC | Age: 89
End: 2024-09-23
Payer: MEDICARE

## 2024-10-03 ENCOUNTER — PATIENT OUTREACH (OUTPATIENT)
Dept: PRIMARY CARE | Facility: CLINIC | Age: 89
End: 2024-10-03
Payer: MEDICARE

## 2024-10-03 DIAGNOSIS — I10 HYPERTENSION, UNSPECIFIED TYPE: ICD-10-CM

## 2024-10-03 DIAGNOSIS — F03.A0 MILD DEMENTIA, UNSPECIFIED DEMENTIA TYPE, UNSPECIFIED WHETHER BEHAVIORAL, PSYCHOTIC, OR MOOD DISTURBANCE OR ANXIETY: ICD-10-CM

## 2024-10-03 NOTE — PROGRESS NOTES
Spoke with dtrEverett. Pt is doing ok. Continues to ambulate without difficulty and uses no device. Has good and bad days with confusion and forgetfulness. Had a home visit thru pt's [facility]. No changes to meds and no refills needed at this time.

## 2024-11-05 ENCOUNTER — PATIENT OUTREACH (OUTPATIENT)
Dept: PRIMARY CARE | Facility: CLINIC | Age: 89
End: 2024-11-05
Payer: MEDICARE

## 2024-11-05 DIAGNOSIS — I10 HYPERTENSION, UNSPECIFIED TYPE: ICD-10-CM

## 2024-11-05 DIAGNOSIS — F03.A0 MILD DEMENTIA, UNSPECIFIED DEMENTIA TYPE, UNSPECIFIED WHETHER BEHAVIORAL, PSYCHOTIC, OR MOOD DISTURBANCE OR ANXIETY: ICD-10-CM

## 2024-11-05 NOTE — PROGRESS NOTES
No significant changes noted with pt. Continues to be independent with mobility. No reported falls. Dtr is still active with decision making and med management as needed. Neuro appt scheduled for 11/15/24. Dtr aware to call this nurse with any questions or concerns.

## 2024-11-12 ENCOUNTER — TELEPHONE (OUTPATIENT)
Dept: PRIMARY CARE | Facility: CLINIC | Age: 89
End: 2024-11-12
Payer: MEDICARE

## 2024-11-12 NOTE — TELEPHONE ENCOUNTER
Spoke to patient's daughter Everett and she said that patient's father is in need of home care because he is unable to bathe himself.   Everett also requested that a referral for dermatology be placed for a mole. Please advise.

## 2024-11-15 ENCOUNTER — APPOINTMENT (OUTPATIENT)
Dept: NEUROLOGY | Facility: CLINIC | Age: 89
End: 2024-11-15
Payer: MEDICARE

## 2024-11-15 ENCOUNTER — TELEMEDICINE (OUTPATIENT)
Dept: PRIMARY CARE | Facility: CLINIC | Age: 89
End: 2024-11-15
Payer: MEDICARE

## 2024-11-15 ENCOUNTER — DOCUMENTATION (OUTPATIENT)
Dept: PRIMARY CARE | Facility: CLINIC | Age: 89
End: 2024-11-15

## 2024-11-15 VITALS — SYSTOLIC BLOOD PRESSURE: 157 MMHG | DIASTOLIC BLOOD PRESSURE: 94 MMHG | TEMPERATURE: 97.5 F

## 2024-11-15 DIAGNOSIS — F03.A3 MILD DEMENTIA WITH MOOD DISTURBANCE, UNSPECIFIED DEMENTIA TYPE (MULTI): Primary | ICD-10-CM

## 2024-11-15 DIAGNOSIS — I48.0 PAROXYSMAL ATRIAL FIBRILLATION (MULTI): ICD-10-CM

## 2024-11-15 DIAGNOSIS — I10 BENIGN ESSENTIAL HYPERTENSION: ICD-10-CM

## 2024-11-15 DIAGNOSIS — E78.5 HYPERLIPIDEMIA, UNSPECIFIED HYPERLIPIDEMIA TYPE: ICD-10-CM

## 2024-11-15 DIAGNOSIS — F03.918 DEMENTIA WITH BEHAVIORAL DISTURBANCE (MULTI): Primary | ICD-10-CM

## 2024-11-15 DIAGNOSIS — I51.9 HEART DISEASE: ICD-10-CM

## 2024-11-15 DIAGNOSIS — R41.89 COGNITIVE IMPAIRMENT: ICD-10-CM

## 2024-11-15 DIAGNOSIS — L98.9 SKIN LESION: ICD-10-CM

## 2024-11-15 PROCEDURE — 3080F DIAST BP >= 90 MM HG: CPT | Performed by: INTERNAL MEDICINE

## 2024-11-15 PROCEDURE — G2211 COMPLEX E/M VISIT ADD ON: HCPCS | Performed by: PSYCHIATRY & NEUROLOGY

## 2024-11-15 PROCEDURE — 99214 OFFICE O/P EST MOD 30 MIN: CPT | Performed by: INTERNAL MEDICINE

## 2024-11-15 PROCEDURE — 3077F SYST BP >= 140 MM HG: CPT | Performed by: INTERNAL MEDICINE

## 2024-11-15 PROCEDURE — 1160F RVW MEDS BY RX/DR IN RCRD: CPT | Performed by: INTERNAL MEDICINE

## 2024-11-15 PROCEDURE — 99214 OFFICE O/P EST MOD 30 MIN: CPT | Performed by: PSYCHIATRY & NEUROLOGY

## 2024-11-15 PROCEDURE — 1159F MED LIST DOCD IN RCRD: CPT | Performed by: INTERNAL MEDICINE

## 2024-11-15 PROCEDURE — G2211 COMPLEX E/M VISIT ADD ON: HCPCS | Performed by: INTERNAL MEDICINE

## 2024-11-15 RX ORDER — DIVALPROEX SODIUM 125 MG/1
TABLET, DELAYED RELEASE ORAL
Qty: 60 TABLET | Refills: 3 | Status: SHIPPED | OUTPATIENT
Start: 2024-11-15

## 2024-11-15 NOTE — PROGRESS NOTES
An interactive audio and video telecommunication system which permits real time communications between the patient (at the originating site) and provider (at the distant site) was utilized to provide this telehealth service.      Verbal consent was requested and obtained from the patient on 11/15/2024    Chief Complaint: Dementia    HPI  89 y.o. male presenting for follow up regarding Dementia.    Since the last visit, the patient's cognition has declined.  He has developed several behavioral issues.  He is often agitated - seemingly worse at night.  His daughter is taking care of the patient and the patient's wife.  When the patient is asked to do something simple (I.e. can you please move out of the way?), he becomes agitated.  At times, he becomes violent - he may push his daughter.  He may hit or kick his .  He does occasionally naps during the day.  He sleeps at night very well.  He is independent for some basic ADLs (I.e. feed himself).  His family manages all of his medications.  He often fights with his family about bathing.  There are no hallucinations.          Current Outpatient Medications:     apixaban (Eliquis) 2.5 mg tablet, Take 1 tablet (2.5 mg) by mouth 2 times a day., Disp: , Rfl:     ascorbic acid (Vitamin C) 500 mg tablet, Take 1 tablet (500 mg) by mouth once daily., Disp: , Rfl:     ASPIRIN ORAL, Take 81 mg by mouth once daily., Disp: , Rfl:     cholecalciferol (Vitamin D-3) 50 MCG (2000 UT) tablet, Take 1 tablet (50 mcg) by mouth once daily., Disp: , Rfl:     cyanocobalamin (Vitamin B-12) 500 mcg tablet, Take 1 tablet (500 mcg) by mouth once daily., Disp: 30 tablet, Rfl: 11    famotidine (Pepcid) 20 mg tablet, Take 1 tablet (20 mg) by mouth 2 times a day., Disp: 180 tablet, Rfl: 3    ferrous sulfate 325 (65 Fe) MG EC tablet, Take 1 tablet by mouth every other day. Do not crush, chew, or split., Disp: 45 tablet, Rfl: 3    ipratropium (Atrovent) 42 mcg (0.06 %) nasal spray, 2 sprays per  nostril TID as needed and before eating. (Patient not taking: Reported on 6/1/2024), Disp: 15 mL, Rfl: 1    lisinopril 40 mg tablet, Lisinopril 40 MG Oral Tablet  Quantity: 90  Refills: 0    Start : 28-May-2021 Active, Disp: 90 tablet, Rfl: 3    tamsulosin (Flomax) 0.4 mg 24 hr capsule, TAKE 1 CAPSULE BY MOUTH 30 MINUTES FOLLOWING THE SAME MEAL EACH DAY, Disp: 90 capsule, Rfl: 3    vit C/E/Zn/coppr/lutein/zeaxan (PRESERVISION AREDS-2 ORAL), Take 1 tablet by mouth 2 times a day., Disp: , Rfl:     VITAMIN B COMPLEX ORAL, Take 1 tablet by mouth 1 (one) time each day., Disp: , Rfl:       Objective:    Relevant Results      Assessment:    Dementia with behavioral disturbances  - since the last visit, he is much more agitated - he is combative  - he is dependent on family for all complex ADLs and some basic ADLs    Plan:  - recommend starting Depakote 125 mg BID  - avoid daytime naps  - make daily routine as predictable as possible  - call/message in a couple weeks with an update  - follow up in 6 months      Jason Garcia MD  [facility]  Department of Neurology

## 2024-11-15 NOTE — PROGRESS NOTES
Subjective   Patient ID: Sherri Grace is a 89 y.o. male who presents for Discuss home care orders  (Patient is here for a virtual visit to discuss home care orders to help with bathing. ).    An interactive audio and video telecommunication system with patient real time communications between the patient (at the original site) and provider (at the distant site) was utilized to provide this telehealth service.  Verbal consent was requested and obtained from the patient at this date for a telehealth.  Patient seen virtually with his daughter Everett by his side she is facilitating the video telehealth, she has been overwhelmed caring for both her mother and father, she is in need of home health to help bathing patient, she had a nurse from the insurance who made the house visit and recommended that for patient.  He is known to have dementia,managed by a neurologist, he was recently started on Depakote 250 mg twice daily for behavioral issues.  Last fall was few months ago.  he also has asymptomatic gallstones.has h/o A fib and CAD,s/p stent,  He sees Dr Reagan for his A Fib,on Eliquis.  he denies any CP or SOB.  he sees Dr Shelby for eye exam,up to date.  he has a living will.  He denies any depressed moods,no alcohol abuse.         Review of Systems   Respiratory: Negative.     Cardiovascular: Negative.    Gastrointestinal: Negative.    Psychiatric/Behavioral:  Positive for confusion.        Objective   BP (!) 157/94   Temp 36.4 °C (97.5 °F) (Temporal)     Physical Exam  Constitutional:       General: He is not in acute distress.  Pulmonary:      Effort: No respiratory distress.   Skin:     Comments: There is a raised dark skin lesion over the left scapular area, will refer patient to a dermatologist.  Patient will call [facility] for appointment.   Neurological:      Mental Status: He is alert.   Psychiatric:      Comments: Pleasantly confused, smiling.         Assessment/Plan   Problem List Items Addressed This Visit              ICD-10-CM    Atrial fibrillation (Multi) I48.91     On Eliquis he will follow-up with his EPS cardiologist .  Fall prevention, he is at increased for bleeding.         Benign essential hypertension I10     Stable on current medication.         Cognitive impairment R41.89     He will see the neurologist.  Will contact advanced primary care nurse manager to get some home care to help bathing patient and check other services.         Mild dementia - Primary F03.A0    Heart disease I51.9    Hyperlipidemia E78.5    Skin lesion L98.9    Relevant Orders    Referral to Dermatology

## 2024-11-15 NOTE — PROGRESS NOTES
Dtr is interested in getting home health care for pt. (Assistance with bathing and other ADL's) Pt to call insurance first to see if anything is offered thru insurance and if any portion is paid. Will continue to follow to assist with finding other services. Daughter aware this may be an out of pocket expense. Appreciative of any help.   You can access the FollowMyHealth Patient Portal offered by [facility] by registering at the following website: [email or web address]. By joining [facility]’s FollowMyHealth portal, you will also be able to view your health information using other applications (apps) compatible with our system.

## 2024-11-17 ASSESSMENT — ENCOUNTER SYMPTOMS
RESPIRATORY NEGATIVE: 1
GASTROINTESTINAL NEGATIVE: 1
CONFUSION: 1
CARDIOVASCULAR NEGATIVE: 1

## 2024-11-18 NOTE — ASSESSMENT & PLAN NOTE
On Eliquis he will follow-up with his EPS cardiologist .  Fall prevention, he is at increased for bleeding.

## 2024-11-18 NOTE — ASSESSMENT & PLAN NOTE
He will see the neurologist.  Will contact advanced primary care nurse manager to get some home care to help bathing patient and check other services.

## 2024-12-03 ENCOUNTER — PATIENT OUTREACH (OUTPATIENT)
Dept: PRIMARY CARE | Facility: CLINIC | Age: 89
End: 2024-12-03
Payer: MEDICARE

## 2024-12-03 DIAGNOSIS — M25.561 ARTHRALGIA OF BOTH KNEES: Primary | ICD-10-CM

## 2024-12-03 DIAGNOSIS — M25.562 ARTHRALGIA OF BOTH KNEES: Primary | ICD-10-CM

## 2024-12-03 DIAGNOSIS — F03.A0 MILD DEMENTIA, UNSPECIFIED DEMENTIA TYPE, UNSPECIFIED WHETHER BEHAVIORAL, PSYCHOTIC, OR MOOD DISTURBANCE OR ANXIETY: ICD-10-CM

## 2024-12-03 DIAGNOSIS — I10 HYPERTENSION, UNSPECIFIED TYPE: ICD-10-CM

## 2024-12-03 NOTE — PROGRESS NOTES
Pt is doing well per dtr. Voiced feeling overwhelmed regarding taking care of parents and needing the extra help. Referred to care-patrol to assist with finding appropriate home care and dtr appreciative of any help. Pt is sitting a lot with wife and not getting any type of exercise as before. No changes noted to meds and no refills needed at this time. Also requesting a wc placard. Request sent to MA.

## 2024-12-18 DIAGNOSIS — K21.9 GASTROESOPHAGEAL REFLUX DISEASE, UNSPECIFIED WHETHER ESOPHAGITIS PRESENT: ICD-10-CM

## 2024-12-18 RX ORDER — FAMOTIDINE 20 MG/1
20 TABLET, FILM COATED ORAL 2 TIMES DAILY
Qty: 180 TABLET | Refills: 3 | Status: SHIPPED | OUTPATIENT
Start: 2024-12-18

## 2025-01-03 ENCOUNTER — PATIENT OUTREACH (OUTPATIENT)
Dept: PRIMARY CARE | Facility: CLINIC | Age: OVER 89
End: 2025-01-03
Payer: MEDICARE

## 2025-01-03 DIAGNOSIS — F03.A0 MILD DEMENTIA, UNSPECIFIED DEMENTIA TYPE, UNSPECIFIED WHETHER BEHAVIORAL, PSYCHOTIC, OR MOOD DISTURBANCE OR ANXIETY: ICD-10-CM

## 2025-01-03 DIAGNOSIS — I10 HYPERTENSION, UNSPECIFIED TYPE: ICD-10-CM

## 2025-01-03 NOTE — PROGRESS NOTES
Pt has been good per dtr. Walks independently and has had no falls. Would still like to get some home health but was not wanting to pay out of pocket. Has really had no extra time to focus on it at this time per dtr, but plans to call pt's insurance and follow up with who had come out for a home visit and evaluation. Aware to call this nurse with any questions or concerns. Area on pt's back that was concerning to dtr, fell off and has scabbed over. She stated it actually looks better and is causing no discomfort to pt. Unfortunately has still been unable to get an appt with dermatology.

## 2025-02-03 ENCOUNTER — PATIENT OUTREACH (OUTPATIENT)
Dept: PRIMARY CARE | Facility: CLINIC | Age: OVER 89
End: 2025-02-03
Payer: MEDICARE

## 2025-02-03 DIAGNOSIS — I10 HYPERTENSION, UNSPECIFIED TYPE: ICD-10-CM

## 2025-02-03 DIAGNOSIS — F03.A0 MILD DEMENTIA, UNSPECIFIED DEMENTIA TYPE, UNSPECIFIED WHETHER BEHAVIORAL, PSYCHOTIC, OR MOOD DISTURBANCE OR ANXIETY: ICD-10-CM

## 2025-02-03 NOTE — PROGRESS NOTES
Spoke with dtr, Everett.. Pt has been ok. Fell approx 1 week ago. Noticed some limping but no redness or swelling. Any private duty available is an out of pocket expense and that is not an option for dtr. Asking if pt qualifies for some home therapy. He's not as active and feels he could benefit from home therapy. Will message PCP. No changes to current med list.

## 2025-02-06 ENCOUNTER — DOCUMENTATION (OUTPATIENT)
Dept: PRIMARY CARE | Facility: CLINIC | Age: OVER 89
End: 2025-02-06
Payer: MEDICARE

## 2025-02-07 ENCOUNTER — TELEMEDICINE (OUTPATIENT)
Dept: PRIMARY CARE | Facility: CLINIC | Age: OVER 89
End: 2025-02-07
Payer: MEDICARE

## 2025-02-07 DIAGNOSIS — F03.A3 MILD DEMENTIA WITH MOOD DISTURBANCE, UNSPECIFIED DEMENTIA TYPE (MULTI): ICD-10-CM

## 2025-02-07 DIAGNOSIS — N18.31 STAGE 3A CHRONIC KIDNEY DISEASE (MULTI): ICD-10-CM

## 2025-02-07 DIAGNOSIS — I48.0 PAROXYSMAL ATRIAL FIBRILLATION (MULTI): ICD-10-CM

## 2025-02-07 DIAGNOSIS — R53.1 GENERALIZED WEAKNESS: ICD-10-CM

## 2025-02-07 DIAGNOSIS — M25.561 ACUTE PAIN OF RIGHT KNEE: Primary | ICD-10-CM

## 2025-02-07 PROBLEM — R29.6 FREQUENT FALLS: Status: ACTIVE | Noted: 2025-02-07

## 2025-02-07 PROCEDURE — 1159F MED LIST DOCD IN RCRD: CPT | Performed by: INTERNAL MEDICINE

## 2025-02-07 PROCEDURE — 1036F TOBACCO NON-USER: CPT | Performed by: INTERNAL MEDICINE

## 2025-02-07 PROCEDURE — G2211 COMPLEX E/M VISIT ADD ON: HCPCS | Performed by: INTERNAL MEDICINE

## 2025-02-07 PROCEDURE — 1160F RVW MEDS BY RX/DR IN RCRD: CPT | Performed by: INTERNAL MEDICINE

## 2025-02-07 PROCEDURE — 99214 OFFICE O/P EST MOD 30 MIN: CPT | Performed by: INTERNAL MEDICINE

## 2025-02-07 ASSESSMENT — ENCOUNTER SYMPTOMS
LOSS OF SENSATION IN FEET: 0
DEPRESSION: 0
OCCASIONAL FEELINGS OF UNSTEADINESS: 1

## 2025-02-07 NOTE — PROGRESS NOTES
Subjective   Patient ID: Sherri Grace is a 89 y.o. male who presents for Fall.    An interactive audio and video telecommunication system with patient real time communications between the patient (at the original site) and provider (at the distant site) was utilized to provide this telehealth service.  Verbal consent was requested and obtained from the patient at this date for a telehealth.  Patient seen virtually with his daughter Everett by his side she is facilitating the video telehealth.  Patient has been complaining of right knee pain and family noted that he has been limping, he has dementia but she told her he might have fallen around 7 to 10 days ago, the knee pain only hurts when he is bearing weight on it when he sit down patient she denied any pain, his daughter Everett has been giving him Tylenol for pain as needed, she noted also that he has been having problem with his balance, he is no longer active his been sitting by the side of his wife who is on hospice currently.  She is asking if we can do a home care for him first strength training exercises also to evaluate the right knee.  He is known to have dementia,managed by a neurologist Dr. Garcia, he was recently started on Depakote 250 mg twice daily for behavioral issues.  he also has asymptomatic gallstones.has h/o A fib and CAD,s/p stent, he is due to see his cardiologist Dr. Reagan   for his A Fib,on Eliquis and aspirin, I advised the family to discuss anticoagulation with his cardiologist because he is at risk for bleeding with his history of frequent fall, probably Watchman procedure will be good for him they need to consider?.  he denies any CP or SOB.  he sees Dr Shelby for eye exam,up to date.  he has a living will.  He denies any depressed moods,no alcohol abuse.         Review of Systems   Musculoskeletal:         As HPI   Neurological:         As HPI   Psychiatric/Behavioral:          As HPI       Objective   There were no vitals taken for  this visit.    Physical Exam  Constitutional:       General: He is not in acute distress.     Comments: Patient able to recognize me, in no acute distress.   Pulmonary:      Effort: No respiratory distress.   Musculoskeletal:      Comments: Right knee OA changes, able to flex at 45 degree, he said it hurt when he stand on it         Assessment/Plan   Problem List Items Addressed This Visit             ICD-10-CM    Atrial fibrillation (Multi) I48.91     Denies any palpitation,continue same meds.         Mild dementia F03.A0     Stable, Seen by neurologist in past.         Generalized weakness R53.1     Will order home physical therapy.  Patient has dementia and history lying on others for transportation, lives with his wife who is chronically ill and on hospice         Stage 3a chronic kidney disease (Multi) N18.31     Avoid NSAID,keep well hydrated,monitor GFR         Acute pain of right knee - Primary M25.561     Status post fall.  Will order x-ray.  Will refer to orthopedic and home physical therapy.         Relevant Orders    XR knee right 3 views

## 2025-02-07 NOTE — PATIENT INSTRUCTIONS
An interactive audio and video telecommunication system with patient real time communications between the patient (at the original site) and provider (at the distant site) was utilized to provide this telehealth service.  Verbal consent was requested and obtained from the patient at this date for a telehealth.

## 2025-02-07 NOTE — ASSESSMENT & PLAN NOTE
Will order home physical therapy.  Patient has dementia and history lying on others for transportation, lives with his wife who is chronically ill and on hospice

## 2025-02-24 DIAGNOSIS — D63.8 ANEMIA, CHRONIC DISEASE: ICD-10-CM

## 2025-02-24 RX ORDER — VIT C/E/ZN/COPPR/LUTEIN/ZEAXAN 250MG-90MG
500 CAPSULE ORAL DAILY
Qty: 90 TABLET | Refills: 3 | Status: SHIPPED | OUTPATIENT
Start: 2025-02-24 | End: 2026-02-24

## 2025-02-24 RX ORDER — FERROUS SULFATE 325(65) MG
325 TABLET, DELAYED RELEASE (ENTERIC COATED) ORAL EVERY OTHER DAY
Qty: 45 TABLET | Refills: 3 | Status: SHIPPED | OUTPATIENT
Start: 2025-02-24 | End: 2026-02-24

## 2025-03-10 ENCOUNTER — HOME HEALTH ADMISSION (OUTPATIENT)
Dept: HOME HEALTH SERVICES | Facility: HOME HEALTH | Age: OVER 89
End: 2025-03-10
Payer: MEDICARE

## 2025-03-10 ENCOUNTER — PATIENT OUTREACH (OUTPATIENT)
Dept: PRIMARY CARE | Facility: CLINIC | Age: OVER 89
End: 2025-03-10
Payer: MEDICARE

## 2025-03-10 ENCOUNTER — DOCUMENTATION (OUTPATIENT)
Dept: HOME HEALTH SERVICES | Facility: HOME HEALTH | Age: OVER 89
End: 2025-03-10

## 2025-03-10 DIAGNOSIS — F03.A0 MILD DEMENTIA, UNSPECIFIED DEMENTIA TYPE, UNSPECIFIED WHETHER BEHAVIORAL, PSYCHOTIC, OR MOOD DISTURBANCE OR ANXIETY: ICD-10-CM

## 2025-03-10 DIAGNOSIS — M25.562 ACUTE PAIN OF LEFT KNEE: ICD-10-CM

## 2025-03-10 DIAGNOSIS — R29.6 FREQUENT FALLS: ICD-10-CM

## 2025-03-10 DIAGNOSIS — I10 HYPERTENSION, UNSPECIFIED TYPE: ICD-10-CM
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
## 2025-03-10 DIAGNOSIS — F03.A3 MILD DEMENTIA WITH MOOD DISTURBANCE, UNSPECIFIED DEMENTIA TYPE (MULTI): Primary | ICD-10-CM

## 2025-03-10 NOTE — HH CARE COORDINATION
Home Care received a Referral for Nursing and Physical Therapy. We have processed the referral for a Start of Care on 03/11/2025.     If you have any questions or concerns, please feel free to contact us at [phone]. Follow the prompts, enter your five digit zip code, and you will be directed to your care team on [facility].

## 2025-03-10 NOTE — PROGRESS NOTES
Dtr returned call.. pt is still losing balance. No injuries noted and no c/o pain. Will follow up with in home therapy request. No significant changes noted with pt. Taking meds as prescribed. Aware to call this nurse with any questions or concerns.

## 2025-03-11 ENCOUNTER — HOME CARE VISIT (OUTPATIENT)
Dept: HOME HEALTH SERVICES | Facility: HOME HEALTH | Age: OVER 89
End: 2025-03-11
Payer: MEDICARE

## 2025-03-11 VITALS
TEMPERATURE: 45.9 F | RESPIRATION RATE: 16 BRPM | HEART RATE: 70 BPM | SYSTOLIC BLOOD PRESSURE: 108 MMHG | DIASTOLIC BLOOD PRESSURE: 62 MMHG | OXYGEN SATURATION: 97 %

## 2025-03-11 PROCEDURE — G0299 HHS/HOSPICE OF RN EA 15 MIN: HCPCS

## 2025-03-11 ASSESSMENT — ENCOUNTER SYMPTOMS
APPETITE LEVEL: GOOD
CONSTIPATION: 1
CHANGE IN APPETITE: UNCHANGED
LAST BOWEL MOVEMENT: 67274

## 2025-03-11 ASSESSMENT — ACTIVITIES OF DAILY LIVING (ADL): ENTERING_EXITING_HOME: MODERATE ASSIST

## 2025-03-12 ASSESSMENT — ACTIVITIES OF DAILY LIVING (ADL): OASIS_M1830: 03

## 2025-03-12 ASSESSMENT — ENCOUNTER SYMPTOMS
DESCRIPTION OF MEMORY LOSS: SHORT TERM
PERSON REPORTING PAIN: PATIENT
DENIES PAIN: 1

## 2025-03-14 ENCOUNTER — HOME CARE VISIT (OUTPATIENT)
Dept: HOME HEALTH SERVICES | Facility: HOME HEALTH | Age: OVER 89
End: 2025-03-14
Payer: MEDICARE

## 2025-03-14 VITALS
HEART RATE: 73 BPM | DIASTOLIC BLOOD PRESSURE: 80 MMHG | SYSTOLIC BLOOD PRESSURE: 135 MMHG | TEMPERATURE: 98.1 F | OXYGEN SATURATION: 99 %

## 2025-03-14 PROCEDURE — G0151 HHCP-SERV OF PT,EA 15 MIN: HCPCS

## 2025-03-14 ASSESSMENT — BALANCE ASSESSMENTS
NUDGED SCORE: 1
ARISING SCORE: 2
STANDING BALANCE: 1 - STEADY BUT WIDE STANCE AND USES CANE OR OTHER SUPPORT
BALANCE SCORE: 12
EYES CLOSED AT MAXIMUM POSITION NUDGED: 0 - UNSTEADY
ARISES: 2 - ABLE WITHOUT USING ARMS
NUDGED: 1 - STAGGERS, GRABS, CATCHES SELF
SITTING BALANCE: 1 - STEADY, SAFE
IMMEDIATE STANDING BALANCE FIRST 5 SECONDS: 2 - STEADY WITHOUT WALKER OR OTHER SUPPORT
SITTING DOWN: 2 - SAFE, SMOOTH MOTION
ATTEMPTS TO ARISE: 2 - ABLE TO RISE, ONE ATTEMPT
TURNING 360 DEGREES STEPS: 1 - CONTINUOUS STEPS

## 2025-03-14 ASSESSMENT — ENCOUNTER SYMPTOMS
PERSON REPORTING PAIN: PATIENT
DENIES PAIN: 1

## 2025-03-14 ASSESSMENT — GAIT ASSESSMENTS
TRUNK: 1 - NO SWAY BUT FLEXION OF KNEES OR BACK OR SPREADS ARMS WHILE WALKING
WALKING STANCE: 0 - HEELS APART
PATH SCORE: 0
INITIATION OF GAIT IMMEDIATELY AFTER GO: 1 - NO HESITANCY
BALANCE AND GAIT SCORE: 20
TRUNK SCORE: 1
PATH: 0 - MARKED DEVIATION
STEP SYMMETRY: 1 - RIGHT AND LEFT STEP LENGTH APPEAR EQUAL
STEP CONTINUITY: 1 - STEPS APPEAR CONTINUOUS
GAIT SCORE: 8

## 2025-03-14 ASSESSMENT — ACTIVITIES OF DAILY LIVING (ADL)
AMBULATION_DISTANCE/DURATION_TOLERATED: 90 FT
AMBULATION ASSISTANCE: STAND BY ASSIST
CURRENT_FUNCTION: ONE PERSON
CURRENT_FUNCTION: STAND BY ASSIST
AMBULATION ASSISTANCE: ONE PERSON
AMBULATION ASSISTANCE ON FLAT SURFACES: 1

## 2025-03-18 DIAGNOSIS — F03.918 DEMENTIA WITH BEHAVIORAL DISTURBANCE (MULTI): ICD-10-CM

## 2025-03-18 RX ORDER — DIVALPROEX SODIUM 125 MG/1
TABLET, DELAYED RELEASE ORAL
Qty: 60 TABLET | Refills: 3 | Status: SHIPPED | OUTPATIENT
Start: 2025-03-18

## 2025-03-19 ENCOUNTER — HOME CARE VISIT (OUTPATIENT)
Dept: HOME HEALTH SERVICES | Facility: HOME HEALTH | Age: OVER 89
End: 2025-03-19
Payer: MEDICARE

## 2025-03-19 PROCEDURE — G0157 HHC PT ASSISTANT EA 15: HCPCS | Mod: CQ

## 2025-03-20 ASSESSMENT — ENCOUNTER SYMPTOMS
PERSON REPORTING PAIN: PATIENT
DENIES PAIN: 1

## 2025-03-24 ENCOUNTER — HOME CARE VISIT (OUTPATIENT)
Dept: HOME HEALTH SERVICES | Facility: HOME HEALTH | Age: OVER 89
End: 2025-03-24
Payer: MEDICARE

## 2025-03-24 DIAGNOSIS — I10 BENIGN ESSENTIAL HYPERTENSION: ICD-10-CM

## 2025-03-24 PROCEDURE — G0157 HHC PT ASSISTANT EA 15: HCPCS | Mod: CQ

## 2025-03-24 RX ORDER — LISINOPRIL 40 MG/1
40 TABLET ORAL DAILY
Qty: 90 TABLET | Refills: 3 | Status: SHIPPED | OUTPATIENT
Start: 2025-03-24

## 2025-03-24 ASSESSMENT — ENCOUNTER SYMPTOMS
PAIN: NO FALLS REPORTED.
DENIES PAIN: 1

## 2025-03-26 ENCOUNTER — HOME CARE VISIT (OUTPATIENT)
Dept: HOME HEALTH SERVICES | Facility: HOME HEALTH | Age: OVER 89
End: 2025-03-26
Payer: MEDICARE

## 2025-03-26 PROCEDURE — G0157 HHC PT ASSISTANT EA 15: HCPCS | Mod: CQ

## 2025-03-26 ASSESSMENT — ENCOUNTER SYMPTOMS
DENIES PAIN: 1
PERSON REPORTING PAIN: PATIENT

## 2025-04-02 ENCOUNTER — HOME CARE VISIT (OUTPATIENT)
Dept: HOME HEALTH SERVICES | Facility: HOME HEALTH | Age: OVER 89
End: 2025-04-02
Payer: MEDICARE

## 2025-04-02 PROCEDURE — G0157 HHC PT ASSISTANT EA 15: HCPCS | Mod: CQ

## 2025-04-03 ASSESSMENT — ENCOUNTER SYMPTOMS
PERSON REPORTING PAIN: PATIENT
DENIES PAIN: 1

## 2025-04-04 ENCOUNTER — HOME CARE VISIT (OUTPATIENT)
Dept: HOME HEALTH SERVICES | Facility: HOME HEALTH | Age: OVER 89
End: 2025-04-04
Payer: MEDICARE

## 2025-04-04 VITALS
HEART RATE: 54 BPM | DIASTOLIC BLOOD PRESSURE: 70 MMHG | OXYGEN SATURATION: 100 % | TEMPERATURE: 97.5 F | SYSTOLIC BLOOD PRESSURE: 125 MMHG

## 2025-04-04 PROCEDURE — G0151 HHCP-SERV OF PT,EA 15 MIN: HCPCS

## 2025-04-04 ASSESSMENT — GAIT ASSESSMENTS
TRUNK SCORE: 1
BALANCE AND GAIT SCORE: 27
PATH: 2 - STRAIGHT WITHOUT WALKING AID
PATH SCORE: 2
GAIT SCORE: 11
WALKING STANCE: 1 - HEELS ALMOST TOUCHING WHILE WALKING
INITIATION OF GAIT IMMEDIATELY AFTER GO: 1 - NO HESITANCY
STEP CONTINUITY: 1 - STEPS APPEAR CONTINUOUS
TRUNK: 1 - NO SWAY BUT FLEXION OF KNEES OR BACK OR SPREADS ARMS WHILE WALKING
STEP SYMMETRY: 1 - RIGHT AND LEFT STEP LENGTH APPEAR EQUAL

## 2025-04-04 ASSESSMENT — BALANCE ASSESSMENTS
IMMEDIATE STANDING BALANCE FIRST 5 SECONDS: 2 - STEADY WITHOUT WALKER OR OTHER SUPPORT
ARISING SCORE: 2
ARISES: 2 - ABLE WITHOUT USING ARMS
NUDGED SCORE: 2
EYES CLOSED AT MAXIMUM POSITION NUDGED: 1 - STEADY
SITTING DOWN: 2 - SAFE, SMOOTH MOTION
TURNING 360 DEGREES STEPS: 1 - CONTINUOUS STEPS
SITTING BALANCE: 1 - STEADY, SAFE
STANDING BALANCE: 2 - NARROW STANCE WITHOUT SUPPORT
ATTEMPTS TO ARISE: 2 - ABLE TO RISE, ONE ATTEMPT
BALANCE SCORE: 16
NUDGED: 2 - STEADY

## 2025-04-04 ASSESSMENT — ENCOUNTER SYMPTOMS
PERSON REPORTING PAIN: PATIENT
DENIES PAIN: 1

## 2025-04-04 ASSESSMENT — ACTIVITIES OF DAILY LIVING (ADL)
HOME_HEALTH_OASIS: 00
OASIS_M1830: 00

## 2025-04-08 ENCOUNTER — PATIENT OUTREACH (OUTPATIENT)
Dept: PRIMARY CARE | Facility: CLINIC | Age: OVER 89
End: 2025-04-08
Payer: MEDICARE

## 2025-04-08 DIAGNOSIS — F03.A0 MILD DEMENTIA, UNSPECIFIED DEMENTIA TYPE, UNSPECIFIED WHETHER BEHAVIORAL, PSYCHOTIC, OR MOOD DISTURBANCE OR ANXIETY: ICD-10-CM

## 2025-04-08 DIAGNOSIS — I10 HYPERTENSION, UNSPECIFIED TYPE: ICD-10-CM

## 2025-04-08 NOTE — PROGRESS NOTES
Spoke with dtr, stated pt is doing well. [place] like PT had helped a lot with strengthening and walking more steady. No reports of falls. No changes to meds and no refills needed at this time. No c/o pain or discomfort per dtr. Advised to call this nurse with any needs and was appreciative of the call and any help available.

## 2025-04-14 ENCOUNTER — TELEPHONE (OUTPATIENT)
Dept: NEUROLOGY | Facility: CLINIC | Age: OVER 89
End: 2025-04-14
Payer: MEDICARE

## 2025-04-14 DIAGNOSIS — F03.918 DEMENTIA WITH BEHAVIORAL DISTURBANCE (MULTI): ICD-10-CM

## 2025-04-14 RX ORDER — DIVALPROEX SODIUM 125 MG/1
125 TABLET, DELAYED RELEASE ORAL 2 TIMES DAILY
Qty: 60 TABLET | Refills: 3 | Status: SHIPPED | OUTPATIENT
Start: 2025-04-14

## 2025-04-14 NOTE — TELEPHONE ENCOUNTER
Pts son called in for a refill on divalproex 125mg tablet.  He was told that a refill of 3 was sent in on 3/18/25 but when he called the pharmacy, the [facility] In [place] said they cannot refill. Can you send in a Rx for it?

## 2025-04-24 DIAGNOSIS — N40.0 BENIGN PROSTATIC HYPERPLASIA, UNSPECIFIED WHETHER LOWER URINARY TRACT SYMPTOMS PRESENT: ICD-10-CM

## 2025-04-24 RX ORDER — TAMSULOSIN HYDROCHLORIDE 0.4 MG/1
CAPSULE ORAL
Qty: 90 CAPSULE | Refills: 3 | Status: SHIPPED | OUTPATIENT
Start: 2025-04-24

## 2025-05-07 ENCOUNTER — PATIENT OUTREACH (OUTPATIENT)
Dept: PRIMARY CARE | Facility: CLINIC | Age: OVER 89
End: 2025-05-07
Payer: MEDICARE

## 2025-05-07 DIAGNOSIS — F03.A0 MILD DEMENTIA, UNSPECIFIED DEMENTIA TYPE, UNSPECIFIED WHETHER BEHAVIORAL, PSYCHOTIC, OR MOOD DISTURBANCE OR ANXIETY: ICD-10-CM

## 2025-05-07 DIAGNOSIS — I10 HYPERTENSION, UNSPECIFIED TYPE: ICD-10-CM

## 2025-05-07 ASSESSMENT — SOCIAL DETERMINANTS OF HEALTH (SDOH)
WITHIN THE LAST YEAR, HAVE TO BEEN RAPED OR FORCED TO HAVE ANY KIND OF SEXUAL ACTIVITY BY YOUR PARTNER OR EX-PARTNER?: PATIENT DECLINED
WITHIN THE LAST YEAR, HAVE YOU BEEN AFRAID OF YOUR PARTNER OR EX-PARTNER?: PATIENT DECLINED
WITHIN THE LAST YEAR, HAVE YOU BEEN KICKED, HIT, SLAPPED, OR OTHERWISE PHYSICALLY HURT BY YOUR PARTNER OR EX-PARTNER?: PATIENT DECLINED
WITHIN THE LAST YEAR, HAVE YOU BEEN HUMILIATED OR EMOTIONALLY ABUSED IN OTHER WAYS BY YOUR PARTNER OR EX-PARTNER?: PATIENT DECLINED

## 2025-05-07 NOTE — PROGRESS NOTES
"Care Management Monthly Outreach  Chart review completed  Confirmation of at least 2 patient identifiers  Change in insurance? No    Has patient been to ER/Urgent Care since last outreach? No    Last Office Visit with PCP: 3/25/2024   Next Office Visit with PCP: Visit date not found   APC Collaboration: n/a    Chronic Conditions and Outreach Summary:   Hypertension, unspecified type    Mild dementia, unspecified dementia type, unspecified whether behavioral, psychotic, or mood disturbance or anxiety    Pt has been doing well per dtr. States he continues to need assistance and redirection at certain times. No reports of falls. BP has been good. Has no c/o chest pain, SOB, HA... Appetite has been good. Doesn't follow a specific diet. No changes noted to current med list and no refills needed at this time. Advised to call this nurse with any questions or concerns.    Medications:   Are there medication changes since last visit? No  Refills needed? No    Social Drivers of Health: Needs Updated  Care Gaps Addressed? Complete  Care Plan addressed: Yes    Upcoming Appointments:     Blood Pressures Reviewed  BP Readings from Last 3 Encounters:   04/04/25 125/70   03/14/25 135/80   03/11/25 108/62     Labs Reviewed:  Lab Results   Component Value Date    CREATININE 1.40 (H) 06/01/2024    GLUCOSE 93 06/01/2024    ALKPHOS 70 05/31/2024    K 3.7 06/01/2024    PROT 8.3 (H) 05/31/2024     06/01/2024    CALCIUM 8.5 (L) 06/01/2024    AST 57 (H) 05/31/2024    ALT 23 05/31/2024    BUN 35 (H) 06/01/2024    MG 2.34 05/31/2024    GFRMALE 51 (A) 11/23/2022     Lab Results   Component Value Date    TRIG 70 03/11/2021    CHOL 169 03/11/2021    HDL 56.0 03/11/2021     No results found for: \"HGBA1C\"  Lab Results   Component Value Date    WBC 7.7 06/01/2024    RBC 3.27 (L) 06/01/2024    HGB 9.4 (L) 06/01/2024     06/01/2024   No other concerns at this time.  Agreeable to continue monthly outreaches.  Encouraged to call if " questions or concerns arise.    Sherine Chun RN

## 2025-06-17 ENCOUNTER — PATIENT OUTREACH (OUTPATIENT)
Dept: PRIMARY CARE | Facility: CLINIC | Age: OVER 89
End: 2025-06-17
Payer: MEDICARE

## 2025-06-17 DIAGNOSIS — F03.A0 MILD DEMENTIA, UNSPECIFIED DEMENTIA TYPE, UNSPECIFIED WHETHER BEHAVIORAL, PSYCHOTIC, OR MOOD DISTURBANCE OR ANXIETY: ICD-10-CM

## 2025-06-17 DIAGNOSIS — I10 HYPERTENSION, UNSPECIFIED TYPE: ICD-10-CM

## 2025-06-18 NOTE — PROGRESS NOTES
"Care Management Monthly Outreach  Chart review completed  Confirmation of at least 2 patient identifiers  Change in insurance? No    Has patient been to ER/Urgent Care since last outreach? No    Last Office Visit with PCP: Visit date not found   Next Office Visit with PCP: Visit date not found   APC Collaboration: n/a    Chronic Conditions and Outreach Summary:   Hypertension, unspecified type    Mild dementia, unspecified dementia type, unspecified whether behavioral, psychotic, or mood disturbance or anxiety    No significant changes with pt. Not getting around as much and has had no falls. Sits a lot per dtr. BP's have been good. No c/o dizziness or HA. No changes noted to current med list. Appetite is good. No combative behavior reported. Increased redirection needed at times. Dtr aware to call this nurse with any needs.    Medications:   Are there medication changes since last visit? No  Refills needed? No    Social Drivers of Health: Deferred  Care Gaps Addressed? Deferred  Care Plan addressed: Yes    Upcoming Appointments:     Blood Pressures Reviewed  BP Readings from Last 3 Encounters:   04/04/25 125/70   03/14/25 135/80   03/11/25 108/62     Labs Reviewed:  Lab Results   Component Value Date    CREATININE 1.40 (H) 06/01/2024    GLUCOSE 93 06/01/2024    ALKPHOS 70 05/31/2024    K 3.7 06/01/2024    PROT 8.3 (H) 05/31/2024     06/01/2024    CALCIUM 8.5 (L) 06/01/2024    AST 57 (H) 05/31/2024    ALT 23 05/31/2024    BUN 35 (H) 06/01/2024    MG 2.34 05/31/2024    GFRMALE 51 (A) 11/23/2022     Lab Results   Component Value Date    TRIG 70 03/11/2021    CHOL 169 03/11/2021    HDL 56.0 03/11/2021     No results found for: \"HGBA1C\"  Lab Results   Component Value Date    WBC 7.7 06/01/2024    RBC 3.27 (L) 06/01/2024    HGB 9.4 (L) 06/01/2024     06/01/2024   No other concerns at this time.  Agreeable to continue monthly outreaches.  Encouraged to call if questions or concerns arise.    Sherine Chun, " RN

## 2025-07-15 ENCOUNTER — PATIENT OUTREACH (OUTPATIENT)
Dept: PRIMARY CARE | Facility: CLINIC | Age: OVER 89
End: 2025-07-15
Payer: MEDICARE

## 2025-07-15 DIAGNOSIS — I10 HYPERTENSION, UNSPECIFIED TYPE: ICD-10-CM

## 2025-07-15 DIAGNOSIS — F03.A0 MILD DEMENTIA, UNSPECIFIED DEMENTIA TYPE, UNSPECIFIED WHETHER BEHAVIORAL, PSYCHOTIC, OR MOOD DISTURBANCE OR ANXIETY: ICD-10-CM

## 2025-07-15 NOTE — PROGRESS NOTES
"Care Management Monthly Outreach  Chart review completed  Confirmation of at least 2 patient identifiers  Change in insurance? No    Has patient been to ER/Urgent Care since last outreach? No    Last Office Visit with PCP: 3/25/2024   Next Office Visit with PCP: Visit date not found   APC Collaboration: n/a    Chronic Conditions and Outreach Summary:   Hypertension, unspecified type    Mild dementia, unspecified dementia type, unspecified whether behavioral, psychotic, or mood disturbance or anxiety    Pt has been well with no specific needs at this time. No changes noted to current med list. Pt has had no falls and is able to walk independently. Poor balance at times. Dtr remains active with pt's care.No changes with appetite.    Medications:   Are there medication changes since last visit? No  Refills needed? No    Social Drivers of Health: Deferred  Care Gaps Addressed? Deferred  Care Plan addressed: Yes    Upcoming Appointments:     Blood Pressures Reviewed  BP Readings from Last 3 Encounters:   04/04/25 125/70   03/14/25 135/80   03/11/25 108/62     Labs Reviewed:  Lab Results   Component Value Date    CREATININE 1.40 (H) 06/01/2024    GLUCOSE 93 06/01/2024    ALKPHOS 70 05/31/2024    K 3.7 06/01/2024    PROT 8.3 (H) 05/31/2024     06/01/2024    CALCIUM 8.5 (L) 06/01/2024    AST 57 (H) 05/31/2024    ALT 23 05/31/2024    BUN 35 (H) 06/01/2024    MG 2.34 05/31/2024    GFRMALE 51 (A) 11/23/2022     Lab Results   Component Value Date    TRIG 70 03/11/2021    CHOL 169 03/11/2021    HDL 56.0 03/11/2021     No results found for: \"HGBA1C\"  Lab Results   Component Value Date    WBC 7.7 06/01/2024    RBC 3.27 (L) 06/01/2024    HGB 9.4 (L) 06/01/2024     06/01/2024   No other concerns at this time.  Agreeable to continue monthly outreaches.  Encouraged to call if questions or concerns arise.    Sherine Chun RN    "

## 2025-07-31 ENCOUNTER — TELEPHONE (OUTPATIENT)
Dept: PRIMARY CARE | Facility: CLINIC | Age: OVER 89
End: 2025-07-31
Payer: MEDICARE

## 2025-07-31 DIAGNOSIS — F03.918 DEMENTIA WITH BEHAVIORAL DISTURBANCE (MULTI): ICD-10-CM

## 2025-07-31 RX ORDER — DIVALPROEX SODIUM 125 MG/1
125 TABLET, DELAYED RELEASE ORAL 2 TIMES DAILY
Qty: 60 TABLET | Refills: 3 | Status: SHIPPED | OUTPATIENT
Start: 2025-07-31

## 2025-08-11 ENCOUNTER — TELEMEDICINE (OUTPATIENT)
Dept: PRIMARY CARE | Facility: CLINIC | Age: OVER 89
End: 2025-08-11
Payer: MEDICARE

## 2025-08-11 DIAGNOSIS — I48.0 PAROXYSMAL ATRIAL FIBRILLATION (MULTI): ICD-10-CM

## 2025-08-11 DIAGNOSIS — H00.015 HORDEOLUM EXTERNUM OF LEFT LOWER EYELID: Primary | ICD-10-CM

## 2025-08-11 DIAGNOSIS — F03.A3 MILD DEMENTIA WITH MOOD DISTURBANCE, UNSPECIFIED DEMENTIA TYPE (MULTI): ICD-10-CM

## 2025-08-11 DIAGNOSIS — E78.5 HYPERLIPIDEMIA, UNSPECIFIED HYPERLIPIDEMIA TYPE: ICD-10-CM

## 2025-08-11 DIAGNOSIS — I10 BENIGN ESSENTIAL HYPERTENSION: ICD-10-CM

## 2025-08-11 PROCEDURE — 1036F TOBACCO NON-USER: CPT | Performed by: INTERNAL MEDICINE

## 2025-08-11 PROCEDURE — 1160F RVW MEDS BY RX/DR IN RCRD: CPT | Performed by: INTERNAL MEDICINE

## 2025-08-11 PROCEDURE — G2211 COMPLEX E/M VISIT ADD ON: HCPCS | Performed by: INTERNAL MEDICINE

## 2025-08-11 PROCEDURE — 99213 OFFICE O/P EST LOW 20 MIN: CPT | Performed by: INTERNAL MEDICINE

## 2025-08-11 PROCEDURE — 1159F MED LIST DOCD IN RCRD: CPT | Performed by: INTERNAL MEDICINE

## 2025-08-11 RX ORDER — ERYTHROMYCIN 5 MG/G
OINTMENT OPHTHALMIC
Qty: 3.5 G | Refills: 0 | Status: SHIPPED | OUTPATIENT
Start: 2025-08-11

## 2025-08-11 ASSESSMENT — PATIENT HEALTH QUESTIONNAIRE - PHQ9
1. LITTLE INTEREST OR PLEASURE IN DOING THINGS: NOT AT ALL
2. FEELING DOWN, DEPRESSED OR HOPELESS: NOT AT ALL
SUM OF ALL RESPONSES TO PHQ9 QUESTIONS 1 AND 2: 0

## 2025-08-15 ENCOUNTER — PATIENT OUTREACH (OUTPATIENT)
Dept: PRIMARY CARE | Facility: CLINIC | Age: OVER 89
End: 2025-08-15
Payer: MEDICARE

## 2025-08-15 DIAGNOSIS — I10 HYPERTENSION, UNSPECIFIED TYPE: ICD-10-CM

## 2025-08-15 DIAGNOSIS — F03.A3 MILD DEMENTIA WITH MOOD DISTURBANCE, UNSPECIFIED DEMENTIA TYPE (MULTI): ICD-10-CM
